# Patient Record
Sex: FEMALE | Race: WHITE | NOT HISPANIC OR LATINO | Employment: STUDENT | URBAN - METROPOLITAN AREA
[De-identification: names, ages, dates, MRNs, and addresses within clinical notes are randomized per-mention and may not be internally consistent; named-entity substitution may affect disease eponyms.]

---
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
## 2019-01-02 RX ORDER — GUANFACINE 2 MG/1
TABLET ORAL
Qty: 60 TABLET | Refills: 0 | Status: SHIPPED | OUTPATIENT
Start: 2019-01-02 | End: 2019-08-09

## 2019-01-24 ENCOUNTER — PATIENT MESSAGE (OUTPATIENT)
Dept: PEDIATRICS | Facility: CLINIC | Age: 19
End: 2019-01-24

## 2019-01-24 DIAGNOSIS — F90.8 HYPERKINESIS OF CHILDHOOD WITH DEVELOPMENTAL DELAY: ICD-10-CM

## 2019-01-24 RX ORDER — GUANFACINE 2 MG/1
TABLET ORAL
Qty: 60 TABLET | Refills: 0 | OUTPATIENT
Start: 2019-01-24

## 2019-01-25 RX ORDER — GUANFACINE 2 MG/1
2 TABLET ORAL 2 TIMES DAILY
Qty: 60 TABLET | Refills: 0 | Status: SHIPPED | OUTPATIENT
Start: 2019-01-25 | End: 2019-02-24 | Stop reason: SDUPTHER

## 2019-01-25 NOTE — TELEPHONE ENCOUNTER
Mom is requesting a refill on Tenex until patient can be seen for a well visit and the first available appointment for a well visit is February 18th.  Allergies and pharmacy verified last office visit 11/09/17. Please advise thank you

## 2019-01-29 RX ORDER — LAMOTRIGINE 200 MG/1
TABLET ORAL
Qty: 30 TABLET | Refills: 2 | Status: SHIPPED | OUTPATIENT
Start: 2019-01-29 | End: 2019-03-01 | Stop reason: SDUPTHER

## 2019-02-24 DIAGNOSIS — F90.8 HYPERKINESIS OF CHILDHOOD WITH DEVELOPMENTAL DELAY: ICD-10-CM

## 2019-02-25 ENCOUNTER — OFFICE VISIT (OUTPATIENT)
Dept: PEDIATRICS | Facility: CLINIC | Age: 19
End: 2019-02-25
Payer: MEDICAID

## 2019-02-25 VITALS
WEIGHT: 230.25 LBS | BODY MASS INDEX: 40.8 KG/M2 | HEART RATE: 112 BPM | DIASTOLIC BLOOD PRESSURE: 80 MMHG | SYSTOLIC BLOOD PRESSURE: 110 MMHG | HEIGHT: 63 IN

## 2019-02-25 DIAGNOSIS — F84.0 AUTISM: Chronic | ICD-10-CM

## 2019-02-25 DIAGNOSIS — Z00.00 ENCOUNTER FOR WELL ADULT EXAM WITHOUT ABNORMAL FINDINGS: Primary | ICD-10-CM

## 2019-02-25 DIAGNOSIS — R46.89 AGGRESSIVE BEHAVIOR: ICD-10-CM

## 2019-02-25 PROCEDURE — 99395 PREV VISIT EST AGE 18-39: CPT | Mod: S$PBB,,, | Performed by: PEDIATRICS

## 2019-02-25 PROCEDURE — 90471 IMMUNIZATION ADMIN: CPT | Mod: PBBFAC

## 2019-02-25 PROCEDURE — 99214 OFFICE O/P EST MOD 30 MIN: CPT | Mod: PBBFAC | Performed by: PEDIATRICS

## 2019-02-25 PROCEDURE — 99999 PR PBB SHADOW E&M-EST. PATIENT-LVL IV: CPT | Mod: PBBFAC,,, | Performed by: PEDIATRICS

## 2019-02-25 PROCEDURE — 99999 PR PBB SHADOW E&M-EST. PATIENT-LVL IV: ICD-10-PCS | Mod: PBBFAC,,, | Performed by: PEDIATRICS

## 2019-02-25 PROCEDURE — 99395 PR PREVENTIVE VISIT,EST,18-39: ICD-10-PCS | Mod: S$PBB,,, | Performed by: PEDIATRICS

## 2019-02-25 RX ORDER — GUANFACINE 2 MG/1
TABLET ORAL
Qty: 60 TABLET | Refills: 0 | Status: SHIPPED | OUTPATIENT
Start: 2019-02-25 | End: 2019-03-24 | Stop reason: SDUPTHER

## 2019-02-25 NOTE — PROGRESS NOTES
Subjective:      Ann Peterson is a 18 y.o. female here with mother. Patient brought in for Well Child      History of Present Illness: 19 yo with autism. Some issues with behavior. Excused for 1 day.  Looking at [employer] for students 18-21 yo for job training and skills training.  On BCPs. And valium daily. Seems better. Less outbursts.   No Flu shots.  Diet- eating well. Good variety. yogurt and milk. Likes veggies more than fruits.   Looking at new psychiatrist. Has been seeing [facility].  Well Child   Pertinent negatives include no abdominal pain, chest pain, congestion, coughing, fever, headaches, joint swelling, rash or sore throat.       Review of Systems   Constitutional: Negative for appetite change and fever.   HENT: Negative for congestion, rhinorrhea and sore throat.    Respiratory: Negative for cough.    Cardiovascular: Negative for chest pain.   Gastrointestinal: Negative for abdominal pain, constipation and diarrhea.   Musculoskeletal: Negative for joint swelling.   Skin: Negative for rash.   Neurological: Positive for seizures (last summer. less seizures). Negative for syncope and headaches.   Psychiatric/Behavioral: Negative for sleep disturbance and suicidal ideas. The patient is not nervous/anxious.        Objective:     Physical Exam   Constitutional: She appears well-developed and well-nourished.   HENT:   Head: Normocephalic and atraumatic.   Right Ear: External ear normal.   Left Ear: External ear normal.   Nose: Nose normal.   Mouth/Throat: Oropharynx is clear and moist. No oral lesions. Normal dentition. No dental caries.   Eyes: EOM are normal. Pupils are equal, round, and reactive to light.   Fundoscopic exam:       The right eye shows no papilledema.        The left eye shows no papilledema.   Neck: Neck supple. No thyromegaly present.   Cardiovascular: Normal rate, regular rhythm and normal heart sounds.   No murmur heard.  Pulmonary/Chest: Effort normal and breath sounds  normal.   Abdominal: Soft. She exhibits no distension and no mass. There is no tenderness.   Genitourinary:   Genitourinary Comments: Johnny stage 5   Musculoskeletal:   No scoliosis   Lymphadenopathy:     She has no cervical adenopathy.   Neurological: She is alert. She has normal reflexes. She displays normal reflexes. No cranial nerve deficit. She exhibits normal muscle tone.   Skin: Skin is warm. No rash noted.   Psychiatric: She has a normal mood and affect. Her behavior is normal.   Vitals reviewed.      Assessment:        1. Encounter for well adult exam    2. Autism  3. Aggressive behavior    Plan:        Ann was seen today for well child.    Diagnoses and all orders for this visit:    Encounter for well adult exam without abnormal findings  -     (In Office Administered) Hepatitis A Vaccine (Adult) (IM)    Safety and guidance information for age provided.  Discussed new school and plan. Looking for new psychiatrist. May look into seeing Dr Helms

## 2019-02-25 NOTE — PATIENT INSTRUCTIONS
If you have an active [facility] account, please look for your well child questionnaire to come to your [facility] account before your next well child visit.    Well-Child Checkup: 14 to 18 Years     Stay involved in your teens life. Make sure your teen knows youre always there when he or she needs to talk.     During the teen years, its important to keep having yearly checkups. Your teen may be embarrassed about having a checkup. Reassure your teen that the exam is normal and necessary. Be aware that the healthcare provider may ask to talk with your child without you in the exam room.  School and social issues  Here are some topics you, your teen, and the healthcare provider may want to discuss during this visit:  · School performance. How is your child doing in school? Is homework finished on time? Does your child stay organized? These are skills you can help with. Keep in mind that a drop in school performance can be a sign of other problems.  · Friendships. Do you like your childs friends? Do the friendships seem healthy? Make sure to talk to your teen about who his or her friends are and how they spend time together. Peer pressure can be a problem among teenagers.  · Life at home. How is your childs behavior? Does he or she get along with others in the family? Is he or she respectful of you, other adults, and authority? Does your child participate in family events, or does he or she withdraw from other family members?  · Risky behaviors. Many teenagers are curious about drugs, alcohol, smoking, and sex. Talk openly about these issues. Answer your childs questions, and dont be afraid to ask questions of your own. If youre not sure how to approach these topics, talk to the healthcare provider for advice.   Puberty  Your teen may still be experiencing some of the changes of puberty, such as:  · Acne and body odor. Hormones that increase during puberty can cause acne (pimples) on the face and body. Hormones  can also increase sweating and cause a stronger body odor.  · Body changes. The body grows and matures during puberty. Hair will grow in the pubic area and on other parts of the body. Girls grow breasts and menstruate (have monthly periods). A boys voice changes, becoming lower and deeper. As the penis matures, erections and wet dreams will start to happen. Talk to your teen about what to expect, and help him or her deal with these changes when possible.  · Emotional changes. Along with these physical changes, youll likely notice changes in your teens personality. He or she may develop an interest in dating and becoming more than friends with other kids. Also, its normal for your teen to be platt. Try to be patient and consistent. Encourage conversations, even when he or she doesnt seem to want to talk. No matter how your teen acts, he or she still needs a parent.  Nutrition and exercise tips  Your teenager likely makes his or her own decisions about what to eat and how to spend free time. You cant always have the final say, but you can encourage healthy habits. Your teen should:  · Get at least 30 to 60 minutes of physical activity every day. This time can be broken up throughout the day. After-school sports, dance or martial arts classes, riding a bike, or even walking to school or a friends house counts as activity.    · Limit screen time to 1 hour each day. This includes time spent watching TV, playing video games, using the computer, and texting. If your teen has a TV, computer, or video game console in the bedroom, consider replacing it with a music player.   · Eat healthy. Your child should eat fruits, vegetables, lean meats, and whole grains every day. Less healthy foods--like french fries, candy, and chips--should be eaten rarely. Some teens fall into the trap of snacking on junk food and fast food throughout the day. Make sure the kitchen is stocked with healthy choices for after-school snacks.  If your teen does choose to eat junk food, consider making him or her buy it with his or her own money.   · Eat 3 meals a day. Many kids skip breakfast and even lunch. Not only is this unhealthy, it can also hurt school performance. Make sure your teen eats breakfast. If your teen does not like the food served at school for lunch, allow him or her to prepare a bag lunch.  · Have at least one family meal with you each day. Busy schedules often limit time for sitting and talking. Sitting and eating together allows for family time. It also lets you see what and how your child eats.   · Limit soda and juice drinks. A small soda is OK once in a while. But soda, sports drinks, and juice drinks are no substitute for healthier drinks. Sports and juice drinks are no better. Water and low-fat or nonfat milk are the best choices.  Hygiene tips  Recommendations for good hygiene include the following:   · Teenagers should bathe or shower daily and use deodorant.  · Let the healthcare provider know if you or your teen have questions about hygiene or acne.  · Bring your teen to the dentist at least twice a year for teeth cleaning and a checkup.  · Remind your teen to brush and floss his or her teeth before bed.  Sleeping tips  During the teen years, sleep patterns may change. Many teenagers have a hard time falling asleep. This can lead to sleeping late the next morning. Here are some tips to help your teen get the rest he or she needs:  · Encourage your teen to keep a consistent bedtime, even on weekends. Sleeping is easier when the body follows a routine. Dont let your teen stay up too late at night or sleep in too long in the morning.  · Help your teen wake up, if needed. Go into the bedroom, open the blinds, and get your teen out of bed -- even on weekends or during school vacations.  · Being active during the day will help your child sleep better at night.  · Discourage use of the TV, computer, or video games for at least an  hour before your teen goes to bed. (This is good advice for parents, too!)  · Make a rule that cell phones must be turned off at night.  Safety tips  Recommendations to keep your teen safe include the following:  · Set rules for how your teen can spend time outside of the house. Give your child a nighttime curfew. If your child has a cell phone, check in periodically by calling to ask where he or she is and what he or she is doing.  · Make sure cell phones and portable music players are used safely and responsibly. Help your teen understand that it is dangerous to talk on the phone, text, or listen to music with headphones while he or she is riding a bike or walking outdoors, especially when crossing the street.  · Constant loud music can cause hearing damage, so monitor your teens music volume. Many music players let you set a limit for how loud the volume can be turned up. Check the directions for details.  · When your teen is old enough for a s license, encourage safe driving. Teach your teen to always wear a seat belt, drive the speed limit, and follow the rules of the road. Do not allow your teenager to text or talk on a cell phone while driving. (And dont do this yourself! Remember, you set an example.)  · Set rules and limits around driving and use of the car. If your teen gets a ticket or has an accident, there should be consequences. Driving is a privilege that can be taken away if your child doesnt follow the rules.  · Teach your child to make good decisions about drugs, alcohol, sex, and other risky behaviors. Work together to come up with strategies for staying safe and dealing with peer pressure. Make sure your teenager knows he or she can always come to you for help.  Tests and vaccines  If you have a strong family history of high cholesterol, your teens blood cholesterol may be tested at this visit. Based on recommendations from the CDC, at this visit your child may receive the following  vaccines:  · Meningococcal  · Influenza (flu), annually  Recognizing signs of depression  Its normal for teenagers to have extreme mood swings as a result of their changing hormones. Its also just a part of growing up. But sometimes a teenagers mood swings are signs of a larger problem. If your teen seems depressed for more than 2 weeks, you should be concerned. Signs of depression include:  · Use of drugs or alcohol  · Problems in school and at home  · Frequent episodes of running away  · Thoughts or talk of death or suicide  · Withdrawal from family and friends  · Sudden changes in eating or sleeping habits  · Sexual promiscuity or unplanned pregnancy  · Hostile behavior or rage  · Loss of pleasure in life  Depressed teens can be helped with treatment. Talk to your childs healthcare provider. Or check with your local mental health center, social service agency, or hospital. Assure your teen that his or her pain can be eased. Offer your love and support. If your teen talks about death or suicide, seek help right away.      Next checkup at: _______________________________     PARENT NOTES:  Date Last Reviewed: 12/1/2016  © 2946-2814 [employer]. [address] [place] [address], [place] [address]. All rights reserved. This information is not intended as a substitute for professional medical care. Always follow your healthcare professional's instructions.

## 2019-03-04 RX ORDER — LAMOTRIGINE 200 MG/1
TABLET ORAL
Qty: 30 TABLET | Refills: 2 | Status: SHIPPED | OUTPATIENT
Start: 2019-03-04 | End: 2019-04-12 | Stop reason: SDUPTHER

## 2019-03-19 ENCOUNTER — PATIENT MESSAGE (OUTPATIENT)
Dept: PEDIATRICS | Facility: CLINIC | Age: 19
End: 2019-03-19

## 2019-03-19 NOTE — ANESTHESIA PREPROCEDURE EVALUATION
2019  Ann Peterson is a 17 y.o., female.  Pre-operative evaluation for Procedure(s) (LRB):  MRI (MAGNETIC RESONANCE IMAGING) (N/A)    Ann Peterson is a 17 y.o. female     LDA:     Prev airway:     Drips:     Patient Active Problem List   Diagnosis    Autism    Aggressive behavior    Seizure disorder    Seizure    Body mass index, pediatric, greater than or equal to 95th percentile for age    Developmental delay       Review of patient's allergies indicates:  No Known Allergies     Current Facility-Administered Medications on File Prior to Encounter   Medication Dose Route Frequency Provider Last Rate Last Dose    ondansetron HCl (PF) 4 mg/2 mL injection    PRN Alessio Santos MD   4 mg at 07/06/15 1109     Current Outpatient Prescriptions on File Prior to Encounter   Medication Sig Dispense Refill    diazePAM (VALIUM) 5 MG tablet TAKE ONE BY MOUTH AS NEEDED FOR ANXIETY 10 tablet 0    guanfacine (TENEX) 2 MG tablet TAKE 1 TABLET BY MOUTH TWICE DAILY 60 tablet 0    lamoTRIgine (LAMICTAL) 200 MG tablet [facility] to [facility]. 25 mg/ml. Si cc po bid (150 mg po bid). 5 refills. Dispense 480 cc 480 tablet 5    risperiDONE (RISPERDAL) 3 MG Tab   3                 Diagnostic Studies:      [facility] Echo:        Anesthesia Evaluation    I have reviewed the Patient Summary Reports.    I have reviewed the Nursing Notes.   I have reviewed the Medications.     Review of Systems  Anesthesia Hx:  No problems with previous Anesthesia    Social:  Non-Smoker    Hematology/Oncology:  Hematology Normal   Oncology Normal     EENT/Dental:EENT/Dental Normal   Cardiovascular:  Cardiovascular Normal     Pulmonary:  Pulmonary Normal    Renal/:  Renal/ Normal     Hepatic/GI:  Hepatic/GI Normal    Musculoskeletal:  Musculoskeletal Normal    Neurological:   Last sz Kelin 5 x 2    Endocrine:  Endocrine Normal    Dermatological:  Skin Normal    Psych:   Autistic- no communication         Physical Exam  General:  Obesity    Airway/Jaw/Neck:  Airway Findings: Mouth Opening: Normal Tongue: Normal  General Airway Assessment: Adult  Mallampati: II  Improves to II with phonation.  TM Distance: Normal, at least 6 cm      Dental:  Dental Findings: In tact   Chest/Lungs:  Chest/Lungs Findings: Clear to auscultation     Heart/Vascular:  Heart Findings: Rate: Normal  Rhythm: Regular Rhythm  Sounds: Normal        Mental Status:  Mental Status Findings:         Anesthesia Plan  Type of Anesthesia, risks & benefits discussed:  Anesthesia Type:  general  Patient's Preference:   Intra-op Monitoring Plan:   Intra-op Monitoring Plan Comments:   Post Op Pain Control Plan:   Post Op Pain Control Plan Comments:   Induction:   Inhalation  Beta Blocker:         Informed Consent: Patient representative understands risks and agrees with Anesthesia plan.  Questions answered.   ASA Score: 3     Day of Surgery Review of History & Physical:     H&P completed by Anesthesiologist.       Ready For Surgery From Anesthesia Perspective.        104

## 2019-03-24 DIAGNOSIS — F90.8 HYPERKINESIS OF CHILDHOOD WITH DEVELOPMENTAL DELAY: ICD-10-CM

## 2019-03-25 RX ORDER — GUANFACINE 2 MG/1
TABLET ORAL
Qty: 60 TABLET | Refills: 0 | Status: SHIPPED | OUTPATIENT
Start: 2019-03-25 | End: 2019-04-22 | Stop reason: SDUPTHER

## 2019-03-26 ENCOUNTER — PATIENT MESSAGE (OUTPATIENT)
Dept: PEDIATRICS | Facility: CLINIC | Age: 19
End: 2019-03-26

## 2019-04-15 ENCOUNTER — TELEPHONE (OUTPATIENT)
Dept: PEDIATRIC NEUROLOGY | Facility: CLINIC | Age: 19
End: 2019-04-15

## 2019-04-15 RX ORDER — LAMOTRIGINE 200 MG/1
TABLET ORAL
Qty: 30 TABLET | Refills: 2 | Status: SHIPPED | OUTPATIENT
Start: 2019-04-15 | End: 2019-05-23 | Stop reason: SDUPTHER

## 2019-04-22 DIAGNOSIS — F90.8 HYPERKINESIS OF CHILDHOOD WITH DEVELOPMENTAL DELAY: ICD-10-CM

## 2019-04-22 RX ORDER — GUANFACINE 2 MG/1
TABLET ORAL
Qty: 60 TABLET | Refills: 0 | Status: SHIPPED | OUTPATIENT
Start: 2019-04-22 | End: 2019-08-09

## 2019-05-27 RX ORDER — LAMOTRIGINE 200 MG/1
TABLET ORAL
Qty: 30 TABLET | Refills: 2 | Status: SHIPPED | OUTPATIENT
Start: 2019-05-27 | End: 2019-07-05 | Stop reason: SDUPTHER

## 2019-07-10 RX ORDER — LAMOTRIGINE 200 MG/1
TABLET ORAL
Qty: 30 TABLET | Refills: 2 | Status: SHIPPED | OUTPATIENT
Start: 2019-07-10 | End: 2019-08-29 | Stop reason: SDUPTHER

## 2019-08-06 ENCOUNTER — TELEPHONE (OUTPATIENT)
Dept: PEDIATRICS | Facility: CLINIC | Age: 19
End: 2019-08-06

## 2019-08-06 NOTE — TELEPHONE ENCOUNTER
----- Message from Willa Holder sent at 8/6/2019 10:40 AM CDT -----  Needs Advice    Reason for call: congested on & off for 2 months, mom would like to schedule appt this week  not able to schedule adult pt.          Communication Preference: mom [phone]    Additional Information:

## 2019-08-06 NOTE — TELEPHONE ENCOUNTER
----- Message from Lucrecia Manley sent at 8/6/2019 11:40 AM CDT -----  Contact: Mom   Type:  Patient Returning Call    Who Called:Mom     Who Left Message for Patient:Shruthi    Does the patient know what this is regarding?:Appt    Would the patient rather a call back or a response via [facility]? Call Back     Best Call Back Number:[phone]    Additional Information:

## 2019-08-06 NOTE — TELEPHONE ENCOUNTER
Are you ok seeing her on Friday for congestion? Just wanted be sure sure now that she is 20 y/o. Mom said Thursday or Friday works best for her.

## 2019-08-09 ENCOUNTER — OFFICE VISIT (OUTPATIENT)
Dept: PEDIATRICS | Facility: CLINIC | Age: 19
End: 2019-08-09
Payer: MEDICAID

## 2019-08-09 VITALS — BODY MASS INDEX: 41.88 KG/M2 | HEART RATE: 122 BPM | WEIGHT: 232.69 LBS | TEMPERATURE: 98 F

## 2019-08-09 DIAGNOSIS — J30.9 ALLERGIC RHINITIS, UNSPECIFIED SEASONALITY, UNSPECIFIED TRIGGER: Primary | ICD-10-CM

## 2019-08-09 PROCEDURE — 99999 PR PBB SHADOW E&M-EST. PATIENT-LVL III: CPT | Mod: PBBFAC,,, | Performed by: PEDIATRICS

## 2019-08-09 PROCEDURE — 99999 PR PBB SHADOW E&M-EST. PATIENT-LVL III: ICD-10-PCS | Mod: PBBFAC,,, | Performed by: PEDIATRICS

## 2019-08-09 PROCEDURE — 99213 OFFICE O/P EST LOW 20 MIN: CPT | Mod: PBBFAC | Performed by: PEDIATRICS

## 2019-08-09 PROCEDURE — 99213 PR OFFICE/OUTPT VISIT, EST, LEVL III, 20-29 MIN: ICD-10-PCS | Mod: S$PBB,,, | Performed by: PEDIATRICS

## 2019-08-09 PROCEDURE — 99213 OFFICE O/P EST LOW 20 MIN: CPT | Mod: S$PBB,,, | Performed by: PEDIATRICS

## 2019-08-09 NOTE — PROGRESS NOTES
Subjective:      Ann Peterson is a 19 y.o. female here with mother. Patient brought in for Nasal Congestion      History of Present Illness:  HPI 20 yo with recurrent congestion. At times gags and is uncomfortable. No fever. Has 2 cats over the past year but mom feels not related as is well with the cats some of the time.  Have tried a variety of OTC med and not clear if any work. No fever. Some gagging and cough.  No sob. Has tried multiple meds and symptoms due seem to come and go.   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   Still some issues with violent behavior. Seeing child psychiatry for her meds.      Review of Systems   Constitutional: Negative for appetite change and fever.   HENT: Positive for congestion. Negative for ear discharge, facial swelling and rhinorrhea.    Respiratory: Negative for cough and shortness of breath.    Gastrointestinal: Negative for diarrhea and vomiting.   Genitourinary: Negative for decreased urine volume.   Skin: Negative for rash.   Hematological: Negative for adenopathy.   Psychiatric/Behavioral: Negative for sleep disturbance.       Objective:     Physical Exam   Constitutional: She appears well-developed and well-nourished. No distress.   HENT:   Right Ear: External ear normal.   Left Ear: External ear normal.   Nose: Nose normal.   Mouth/Throat: Oropharynx is clear and moist.   Swollen turbinates, some what red   Eyes: Conjunctivae are normal.   Neck: Neck supple.   Cardiovascular: Normal rate, regular rhythm and normal heart sounds.   Pulmonary/Chest: Effort normal and breath sounds normal.   Abdominal: Soft. She exhibits no distension and no mass. There is no tenderness.   Musculoskeletal: Normal range of motion.   Lymphadenopathy:     She has no cervical adenopathy.   Skin: No rash noted.   Vitals reviewed.      Assessment:        1. Allergic rhinitis, unspecified seasonality, unspecified trigger         Plan:       suspect AR vs  recurrent URIs. Given time of year ( summer) would suspect allergies. Mom to try regular use of zyrtec or other antihistamine and see if can tolerate nasal steroids.  School working to give resources including psychiatrists to help young adults with disabilities.

## 2019-08-17 NOTE — TELEPHONE ENCOUNTER
Refill request for Guanfacine 2mg  Last seen: 11/23/2016  Allergies and pharmacy verified    This is a [facility] patient but he will be out for the rest of the week and mom only has 2 left.     118

## 2019-08-22 DIAGNOSIS — N94.6 DYSMENORRHEA: ICD-10-CM

## 2019-08-22 DIAGNOSIS — N94.3 PMS (PREMENSTRUAL SYNDROME): ICD-10-CM

## 2019-08-22 DIAGNOSIS — F84.0 AUTISM: ICD-10-CM

## 2019-08-22 RX ORDER — ACETAMINOPHEN AND CODEINE PHOSPHATE 120; 12 MG/5ML; MG/5ML
1 SOLUTION ORAL DAILY
Qty: 90 TABLET | Refills: 3 | Status: SHIPPED | OUTPATIENT
Start: 2019-08-22 | End: 2020-02-04

## 2019-08-29 RX ORDER — LAMOTRIGINE 200 MG/1
TABLET ORAL
Qty: 30 TABLET | Refills: 2 | Status: SHIPPED | OUTPATIENT
Start: 2019-08-29 | End: 2019-10-10 | Stop reason: SDUPTHER

## 2019-09-10 ENCOUNTER — OFFICE VISIT (OUTPATIENT)
Dept: OBSTETRICS AND GYNECOLOGY | Facility: CLINIC | Age: 19
End: 2019-09-10
Payer: MEDICAID

## 2019-09-10 VITALS
BODY MASS INDEX: 43 KG/M2 | WEIGHT: 233.69 LBS | HEIGHT: 62 IN | DIASTOLIC BLOOD PRESSURE: 72 MMHG | SYSTOLIC BLOOD PRESSURE: 112 MMHG

## 2019-09-10 DIAGNOSIS — Z30.41 ENCOUNTER FOR SURVEILLANCE OF CONTRACEPTIVE PILLS: Primary | ICD-10-CM

## 2019-09-10 PROCEDURE — 99212 OFFICE O/P EST SF 10 MIN: CPT | Mod: S$PBB,,, | Performed by: OBSTETRICS & GYNECOLOGY

## 2019-09-10 PROCEDURE — 99213 OFFICE O/P EST LOW 20 MIN: CPT | Mod: PBBFAC | Performed by: OBSTETRICS & GYNECOLOGY

## 2019-09-10 PROCEDURE — 99212 PR OFFICE/OUTPT VISIT, EST, LEVL II, 10-19 MIN: ICD-10-PCS | Mod: S$PBB,,, | Performed by: OBSTETRICS & GYNECOLOGY

## 2019-09-10 PROCEDURE — 99999 PR PBB SHADOW E&M-EST. PATIENT-LVL III: CPT | Mod: PBBFAC,,, | Performed by: OBSTETRICS & GYNECOLOGY

## 2019-09-10 PROCEDURE — 99999 PR PBB SHADOW E&M-EST. PATIENT-LVL III: ICD-10-PCS | Mod: PBBFAC,,, | Performed by: OBSTETRICS & GYNECOLOGY

## 2019-09-10 RX ORDER — GUANFACINE 2 MG/1
2 TABLET ORAL
COMMUNITY
Start: 2019-04-25 | End: 2020-12-01

## 2019-09-10 RX ORDER — RISPERIDONE 3 MG/1
3 TABLET ORAL
COMMUNITY
Start: 2019-04-25 | End: 2020-08-25 | Stop reason: ALTCHOICE

## 2019-09-10 RX ORDER — RISPERIDONE 0.5 MG/1
0.5 TABLET ORAL
COMMUNITY
Start: 2019-04-25 | End: 2020-08-25 | Stop reason: ALTCHOICE

## 2019-09-10 NOTE — PROGRESS NOTES
Gynecology    SUBJECTIVE:     Chief Complaint: Annual Exam       History of Present Illness:  19 year old who returns for follow up. She has Autism and behavior issues as well as increased aggression with menstrual cycles.  Last year, we started her on ortho micronor which she crushes for administration.  Parents report that the medication is working well.  She does still seem to have cramps occasionally and they give her ibuprofen which helps.  She only has occasional spotting with the medication.  No other issues.    Review of Systems:  Review of Systems   Genitourinary: Negative for menorrhagia, menstrual problem, pelvic pain, vaginal bleeding, vaginal discharge, vaginal pain and vaginal odor.        OBJECTIVE:     Physical Exam:  Physical Exam   Constitutional: She is oriented to person, place, and time. She appears well-developed and well-nourished.   Pulmonary/Chest: Effort normal.   Neurological: She is oriented to person, place, and time.   Skin: No pallor.   Psychiatric: She has a normal mood and affect. Her behavior is normal. Judgment and thought content normal.   Nursing note and vitals reviewed.        ASSESSMENT:       ICD-10-CM ICD-9-CM    1. Encounter for surveillance of contraceptive pills Z30.41 V25.41           Plan:      Ann was seen today for annual exam.    Diagnoses and all orders for this visit:    Encounter for surveillance of contraceptive pills    - continue with micronor; patient tolerating well without issues  - patient's mother will request refill when needed;  - discussed that they can come every other year if easier;  - discussed whether the patient would need exams; we reviewed that the guidelines suggest a pap starting at age 21 regardless of sexual activity; however, I reviewed that I would be okay with digital palpation if the cervix rather than a speculum exam given that we know she is not sexually active and has no way of acquiring HPV virus; if a pap were necessary, I would  recommended EUA; family in agreement    No orders of the defined types were placed in this encounter.      Follow up for annual or prn.    Fernanda Chin

## 2019-10-15 RX ORDER — LAMOTRIGINE 200 MG/1
TABLET ORAL
Qty: 30 TABLET | Refills: 2 | Status: SHIPPED | OUTPATIENT
Start: 2019-10-15 | End: 2020-02-10 | Stop reason: SDUPTHER

## 2019-11-27 NOTE — TELEPHONE ENCOUNTER
8 cc po q am and 7 cc po q hs x 1 week; then 8 cc po bid x 1 week;. Cleopatra swann 4/26/18 3:28 pm  
Message sent to mother via [facility] with Dr Blackburn's instructions.  
English

## 2019-12-23 ENCOUNTER — TELEPHONE (OUTPATIENT)
Dept: PEDIATRIC NEUROLOGY | Facility: CLINIC | Age: 19
End: 2019-12-23

## 2019-12-23 NOTE — TELEPHONE ENCOUNTER
----- Message from Dayami Gastelum sent at 12/21/2019  9:31 AM CST -----  Contact: [phone]   Needs Advice    Reason for call: medication         Communication Preference: dad  [phone]  Mom  [phone]     Additional Information:  Dad called to say that medication was supposed to be delivered to the home on yesterday and nothing showed up the home from [facility].  Medication is  lamoTRIgine (LAMICTAL). Pt's mother drove to [employer] to  medication.

## 2019-12-23 NOTE — TELEPHONE ENCOUNTER
Spoke to mother and informed her that [facility] has been notified and refill sent. She verbalized understanding. Dr Blackburn sent new prescription with refills 10/25/19, but refills were for 14 days.

## 2019-12-23 NOTE — TELEPHONE ENCOUNTER
----- Message from Gwendolyn Boswell sent at 12/21/2019  9:18 AM CST -----  Good morning, the patient's mother called in and stated [facility] Thank you.       Respectfully,       Gwendolyn Boswell

## 2019-12-23 NOTE — TELEPHONE ENCOUNTER
----- Message from Patricia Marcano sent at 12/23/2019  9:41 AM CST -----  Needs Advice    Reason for call: PA needed by Pharmacy        Communication Preference: Jyoti Curran- [phone]    Additional Information: Mom states [facility] needs a PA request done in order to refill this medication. Mom has called a on Thursday,Friday, Saturday and today to get this done. [facility] filled a temporary few day supply for the patient but will not give anymore until this PA request is done. The pharmacy is closed tomorrow for Christmas Eve & Javan day. Please submit a PA request.

## 2020-01-02 ENCOUNTER — TELEPHONE (OUTPATIENT)
Dept: PEDIATRIC NEUROLOGY | Facility: CLINIC | Age: 20
End: 2020-01-02

## 2020-01-02 NOTE — TELEPHONE ENCOUNTER
----- Message from Lucrecia Manley sent at 1/2/2020  8:52 AM CST -----  Type:  Needs Medical Advice    Who Called:Mom     Would the patient rather a call back or a response via [facility]? Call back     Best Call Back Number: [phone]    Additional Information: Mom would like to reschedule the pt appt.

## 2020-01-02 NOTE — TELEPHONE ENCOUNTER
Spoke to mother; cancelled 1/3/2020 appt due to conflicting schedules. Rescheduled to 2/10/2020 at 3pm.

## 2020-01-13 ENCOUNTER — PATIENT MESSAGE (OUTPATIENT)
Dept: PEDIATRICS | Facility: CLINIC | Age: 20
End: 2020-01-13

## 2020-01-20 ENCOUNTER — TELEPHONE (OUTPATIENT)
Dept: PEDIATRIC NEUROLOGY | Facility: CLINIC | Age: 20
End: 2020-01-20

## 2020-01-20 NOTE — TELEPHONE ENCOUNTER
Contacted [facility]; patient needs Lamictal refill not PA. Appt 2/10/2020. One refill approved for Lamictal

## 2020-01-20 NOTE — TELEPHONE ENCOUNTER
----- Message from Beatriz Bowman sent at 1/20/2020 12:46 PM CST -----  Contact: Mom [phone]  Prescription refill request.  RX name and strength (copy/paste from chart):   lamotrigine (LAMICTAL) 1 mg/mL Susp      Is this a 30 day or 90 day RX:  30    Pharmacy name and phone # (copy/paste from chart): [facility]    Additional information:  Calling to speak with the nurse regarding pt medication lamotrigine (LAMICTAL) 1 mg/mL Susp. Mom states the medication needs a PA and she would like a call back with advice.

## 2020-02-03 DIAGNOSIS — F84.0 AUTISM: ICD-10-CM

## 2020-02-03 DIAGNOSIS — N94.6 DYSMENORRHEA: ICD-10-CM

## 2020-02-03 DIAGNOSIS — N94.3 PMS (PREMENSTRUAL SYNDROME): ICD-10-CM

## 2020-02-03 RX ORDER — ACETAMINOPHEN AND CODEINE PHOSPHATE 120; 12 MG/5ML; MG/5ML
1 SOLUTION ORAL DAILY
Qty: 90 TABLET | Refills: 3 | Status: CANCELLED | OUTPATIENT
Start: 2020-02-03 | End: 2021-02-02

## 2020-02-04 RX ORDER — ACETAMINOPHEN AND CODEINE PHOSPHATE 120; 12 MG/5ML; MG/5ML
1 SOLUTION ORAL DAILY
Qty: 90 TABLET | Refills: 3 | Status: SHIPPED | OUTPATIENT
Start: 2020-02-04 | End: 2021-03-29

## 2020-02-10 ENCOUNTER — OFFICE VISIT (OUTPATIENT)
Dept: PEDIATRIC NEUROLOGY | Facility: CLINIC | Age: 20
End: 2020-02-10
Payer: MEDICAID

## 2020-02-10 ENCOUNTER — LAB VISIT (OUTPATIENT)
Dept: LAB | Facility: HOSPITAL | Age: 20
End: 2020-02-10
Payer: MEDICAID

## 2020-02-10 VITALS — HEIGHT: 62 IN | WEIGHT: 231.13 LBS | BODY MASS INDEX: 42.53 KG/M2

## 2020-02-10 DIAGNOSIS — G40.909 SEIZURE DISORDER: ICD-10-CM

## 2020-02-10 DIAGNOSIS — R62.50 DEVELOPMENTAL DELAY: ICD-10-CM

## 2020-02-10 DIAGNOSIS — R46.89 AGGRESSIVE BEHAVIOR: ICD-10-CM

## 2020-02-10 DIAGNOSIS — R56.9 SEIZURE: ICD-10-CM

## 2020-02-10 DIAGNOSIS — G40.909 SEIZURE DISORDER: Primary | ICD-10-CM

## 2020-02-10 DIAGNOSIS — F84.0 AUTISM: Chronic | ICD-10-CM

## 2020-02-10 LAB
ALBUMIN SERPL BCP-MCNC: 4.1 G/DL (ref 3.5–5.2)
ALP SERPL-CCNC: 130 U/L (ref 55–135)
ALT SERPL W/O P-5'-P-CCNC: 20 U/L (ref 10–44)
ANION GAP SERPL CALC-SCNC: 9 MMOL/L (ref 8–16)
AST SERPL-CCNC: 18 U/L (ref 10–40)
BASOPHILS # BLD AUTO: 0.03 K/UL (ref 0–0.2)
BASOPHILS NFR BLD: 0.3 % (ref 0–1.9)
BILIRUB SERPL-MCNC: 0.2 MG/DL (ref 0.1–1)
BUN SERPL-MCNC: 11 MG/DL (ref 6–20)
CALCIUM SERPL-MCNC: 9.8 MG/DL (ref 8.7–10.5)
CHLORIDE SERPL-SCNC: 105 MMOL/L (ref 95–110)
CHOLEST SERPL-MCNC: 171 MG/DL (ref 120–199)
CHOLEST/HDLC SERPL: 5 {RATIO} (ref 2–5)
CO2 SERPL-SCNC: 29 MMOL/L (ref 23–29)
CREAT SERPL-MCNC: 0.9 MG/DL (ref 0.5–1.4)
DIFFERENTIAL METHOD: ABNORMAL
EOSINOPHIL # BLD AUTO: 0.1 K/UL (ref 0–0.5)
EOSINOPHIL NFR BLD: 1.1 % (ref 0–8)
ERYTHROCYTE [DISTWIDTH] IN BLOOD BY AUTOMATED COUNT: 13.8 % (ref 11.5–14.5)
EST. GFR  (AFRICAN AMERICAN): >60 ML/MIN/1.73 M^2
EST. GFR  (NON AFRICAN AMERICAN): >60 ML/MIN/1.73 M^2
ESTIMATED AVG GLUCOSE: 117 MG/DL (ref 68–131)
GLUCOSE SERPL-MCNC: 92 MG/DL (ref 70–110)
HBA1C MFR BLD HPLC: 5.7 % (ref 4–5.6)
HCG INTACT+B SERPL-ACNC: <1.2 MIU/ML
HCT VFR BLD AUTO: 39.2 % (ref 37–48.5)
HDLC SERPL-MCNC: 34 MG/DL (ref 40–75)
HDLC SERPL: 19.9 % (ref 20–50)
HGB BLD-MCNC: 12.9 G/DL (ref 12–16)
LDLC SERPL CALC-MCNC: 112 MG/DL (ref 63–159)
LYMPHOCYTES # BLD AUTO: 2.6 K/UL (ref 1–4.8)
LYMPHOCYTES NFR BLD: 21.9 % (ref 18–48)
MCH RBC QN AUTO: 28.6 PG (ref 27–31)
MCHC RBC AUTO-ENTMCNC: 32.9 G/DL (ref 32–36)
MCV RBC AUTO: 87 FL (ref 82–98)
MONOCYTES # BLD AUTO: 1.1 K/UL (ref 0.3–1)
MONOCYTES NFR BLD: 9.4 % (ref 4–15)
NEUTROPHILS # BLD AUTO: 8.1 K/UL (ref 1.8–7.7)
NEUTROPHILS NFR BLD: 67.3 % (ref 38–73)
NONHDLC SERPL-MCNC: 137 MG/DL
PLATELET # BLD AUTO: 302 K/UL (ref 150–350)
PMV BLD AUTO: 10.3 FL (ref 9.2–12.9)
POTASSIUM SERPL-SCNC: 4.4 MMOL/L (ref 3.5–5.1)
PROT SERPL-MCNC: 7.3 G/DL (ref 6–8.4)
RBC # BLD AUTO: 4.51 M/UL (ref 4–5.4)
SODIUM SERPL-SCNC: 143 MMOL/L (ref 136–145)
T4 FREE SERPL-MCNC: 0.98 NG/DL (ref 0.71–1.51)
TRIGL SERPL-MCNC: 125 MG/DL (ref 30–150)
TSH SERPL DL<=0.005 MIU/L-ACNC: 2.38 UIU/ML (ref 0.4–4)
WBC # BLD AUTO: 11.94 K/UL (ref 3.9–12.7)

## 2020-02-10 PROCEDURE — 36415 COLL VENOUS BLD VENIPUNCTURE: CPT

## 2020-02-10 PROCEDURE — 99213 OFFICE O/P EST LOW 20 MIN: CPT | Mod: PBBFAC | Performed by: PSYCHIATRY & NEUROLOGY

## 2020-02-10 PROCEDURE — 99999 PR PBB SHADOW E&M-EST. PATIENT-LVL III: ICD-10-PCS | Mod: PBBFAC,,, | Performed by: PSYCHIATRY & NEUROLOGY

## 2020-02-10 PROCEDURE — 84443 ASSAY THYROID STIM HORMONE: CPT

## 2020-02-10 PROCEDURE — 84702 CHORIONIC GONADOTROPIN TEST: CPT

## 2020-02-10 PROCEDURE — 80175 DRUG SCREEN QUAN LAMOTRIGINE: CPT

## 2020-02-10 PROCEDURE — 85025 COMPLETE CBC W/AUTO DIFF WBC: CPT

## 2020-02-10 PROCEDURE — 83036 HEMOGLOBIN GLYCOSYLATED A1C: CPT

## 2020-02-10 PROCEDURE — 84439 ASSAY OF FREE THYROXINE: CPT

## 2020-02-10 PROCEDURE — 99213 PR OFFICE/OUTPT VISIT, EST, LEVL III, 20-29 MIN: ICD-10-PCS | Mod: S$PBB,,, | Performed by: PSYCHIATRY & NEUROLOGY

## 2020-02-10 PROCEDURE — 99213 OFFICE O/P EST LOW 20 MIN: CPT | Mod: S$PBB,,, | Performed by: PSYCHIATRY & NEUROLOGY

## 2020-02-10 PROCEDURE — 80053 COMPREHEN METABOLIC PANEL: CPT

## 2020-02-10 PROCEDURE — 99999 PR PBB SHADOW E&M-EST. PATIENT-LVL III: CPT | Mod: PBBFAC,,, | Performed by: PSYCHIATRY & NEUROLOGY

## 2020-02-10 PROCEDURE — 80061 LIPID PANEL: CPT

## 2020-02-10 RX ORDER — LAMOTRIGINE 200 MG/1
TABLET ORAL
Qty: 30 TABLET | Refills: 2 | Status: SHIPPED | OUTPATIENT
Start: 2020-02-10 | End: 2020-04-27

## 2020-02-10 NOTE — PROGRESS NOTES
Ann Peterson is a 19-year-old female child was initially seen by me on 10/22/2014.  The child returns today with her mother and father.    Ann carries the diagnosis of autism.  She has a seizure disorder and aggressive behavior toward self and others.    Her 1st seizure was in October of 2014.  At that time, her 10-year-old sister was with her.  She had generalized tonic-clonic activity which lasted 30-60 seconds.  A CT scan without contrast was done which was normal.  We have been unable to get an EEG.    I had the opportunity to review the chart.  Her last labs were in July of 2018.  At that time, her TSH was 4.142 (normal upper limits is 4.0).  Her Lamictal level was 6.7.    Ann is currently on Lamictal 25 mg per male 8 cc p.o. b.i.d. (200 mg p.o. b.i.d.).    In the spring of 2018, Ann's seizures became more frequent.  In addition, her aggression has gotten worse.  She remains under the care of Dr. dougherty.  She is on Risperdal, Tenex.    After we increased the Lamictal dosage, Mom said there have been no further seizures.  Mother feels she has been doing well.    Weight is 104.85 kg (an increase of 2.7 kg).  Her height is 158 cm.    I am able to listen to her heart and lungs.  Everything is normal.    Almost everything else is by observation.  She is okay as long as we leave her alone to play with her stickers and to pull the magnet out of the cover.    She has no ataxia.  She is able to walk into the room by herself.  She is sitting in a chair.  She is interacting with her parents today.  The last time she was here, she was talking to herself.    She has no tremor.  She has no nystagmus.    Extraocular movements are full and conjugate.  She stares at me.  I am not sure I get a follow.    The plan is to continue the same dose of Lamictal; obtain labs today; continue on psychiatric medications; see her back next year or sooner if there are problems.

## 2020-02-12 ENCOUNTER — TELEPHONE (OUTPATIENT)
Dept: PEDIATRIC NEUROLOGY | Facility: CLINIC | Age: 20
End: 2020-02-12

## 2020-02-12 LAB — LAMOTRIGINE SERPL-MCNC: 7.1 UG/ML (ref 2–15)

## 2020-02-12 NOTE — TELEPHONE ENCOUNTER
Contacted [facility] and informed pharmacist to disregard Lamictal prescription that was faxed. Contacted [facility] pharmacy and called in prescription.

## 2020-02-12 NOTE — TELEPHONE ENCOUNTER
----- Message from Olga Gastelum sent at 2/12/2020 11:09 AM CST -----  Contact: Pharmacy [phone]  Pharmacy is calling to clarify or change an RX.    RX name:  lamoTRIgine (LAMICTAL) 200 MG tablet    What do they need to clarify:  Pharmacy doesn't do compound drugs says they will put Rx on hold and is requesting a call back.    Additional comments:

## 2020-04-20 ENCOUNTER — PATIENT MESSAGE (OUTPATIENT)
Dept: PEDIATRICS | Facility: CLINIC | Age: 20
End: 2020-04-20

## 2020-04-27 RX ORDER — LAMOTRIGINE 200 MG/1
TABLET ORAL
Qty: 60 TABLET | Refills: 2 | Status: SHIPPED | OUTPATIENT
Start: 2020-04-27 | End: 2020-07-20

## 2020-04-27 RX ORDER — LAMOTRIGINE 200 MG/1
TABLET ORAL
Qty: 30 TABLET | Refills: 2 | Status: SHIPPED | OUTPATIENT
Start: 2020-04-27 | End: 2020-12-01

## 2020-06-04 ENCOUNTER — HOSPITAL ENCOUNTER (EMERGENCY)
Facility: HOSPITAL | Age: 20
Discharge: HOME | End: 2020-06-04
Attending: EMERGENCY MEDICINE
Payer: MEDICAID

## 2020-06-04 VITALS
BODY MASS INDEX: 35.36 KG/M2 | WEIGHT: 220 LBS | SYSTOLIC BLOOD PRESSURE: 135 MMHG | HEART RATE: 76 BPM | OXYGEN SATURATION: 98 % | RESPIRATION RATE: 16 BRPM | DIASTOLIC BLOOD PRESSURE: 77 MMHG | TEMPERATURE: 99 F | HEIGHT: 66 IN

## 2020-06-04 DIAGNOSIS — S61.412A LACERATION OF LEFT HAND WITHOUT FOREIGN BODY, INITIAL ENCOUNTER: Primary | ICD-10-CM

## 2020-06-04 PROCEDURE — 12002 RPR S/N/AX/GEN/TRNK2.6-7.5CM: CPT

## 2020-06-04 PROCEDURE — 25000003 PHARM REV CODE 250: Performed by: PHYSICIAN ASSISTANT

## 2020-06-04 PROCEDURE — 99283 EMERGENCY DEPT VISIT LOW MDM: CPT | Mod: 25

## 2020-06-04 RX ORDER — LORAZEPAM 0.5 MG/1
1 TABLET ORAL
Status: COMPLETED | OUTPATIENT
Start: 2020-06-04 | End: 2020-06-04

## 2020-06-04 RX ORDER — LIDOCAINE HYDROCHLORIDE 10 MG/ML
10 INJECTION INFILTRATION; PERINEURAL
Status: COMPLETED | OUTPATIENT
Start: 2020-06-04 | End: 2020-06-04

## 2020-06-04 RX ADMIN — LIDOCAINE-EPINEPHRINE-TETRACAINE GEL 4-0.05-0.5% 1 ML: 4-0.05-0.5 GEL at 07:06

## 2020-06-04 RX ADMIN — LORAZEPAM 1 MG: 0.5 TABLET ORAL at 08:06

## 2020-06-04 RX ADMIN — LIDOCAINE HYDROCHLORIDE 10 ML: 10 INJECTION, SOLUTION INFILTRATION; PERINEURAL at 07:06

## 2020-06-05 NOTE — ED PROVIDER NOTES
Encounter Date: 2020    SCRIBE #1 NOTE: I, Cordell Boyd, am scribing for, and in the presence of,  Foreign Gallagher PA-C. I have scribed the following portions of the note - Other sections scribed: HPI, ROS, PE.       History     Chief Complaint   Patient presents with    Laceration     pt. with father, who reports pt. is austic and has a tick at times. father reports he thinks pt. hit a glass window and accidently caused a lac to her left hand on the top. area is wrapped with no noted bleeding.      CC: Laceration    HPI:  This is a 19 y.o. female with a PMHx of Autism. She presents to the Emergency Department with a cc of multiple lacerations to the left hand which occurred just PTA. The complaint is secondary to the patient breaking a glass window. Per father, he outside doing yard work when he saw the patient through the window before hearing it break. Father states that patient is autistic and sometimes has a tick which he thinks caused the patient to accidentally break the window. There were no other symptoms reported. No alleviating or worsening factors reported. Father reports no prior history of similar symptoms.         The history is provided by a relative. No  was used.     Review of patient's allergies indicates:  No Known Allergies  Past Medical History:   Diagnosis Date    Autism     Seizure      No past surgical history on file.  Family History   Problem Relation Age of Onset    Hypertension Father     Autism spectrum disorder Sister     Hypertension Paternal Grandmother     Heart disease Paternal Grandmother     Hyperlipidemia Paternal Grandmother     Breast cancer Neg Hx     Cancer Neg Hx     Colon cancer Neg Hx     Diabetes Neg Hx     Eclampsia Neg Hx     Miscarriages / Stillbirths Neg Hx     Ovarian cancer Neg Hx      labor Neg Hx     Stroke Neg Hx      Social History     Tobacco Use    Smoking status: Never Smoker    Smokeless tobacco: Never Used    Substance Use Topics    Alcohol use: No    Drug use: No     Review of Systems   Unable to perform ROS: Other       Physical Exam     Initial Vitals [06/04/20 1852]   BP Pulse Resp Temp SpO2   127/76 93 20 97.6 °F (36.4 °C) 99 %      MAP       --         Physical Exam    Nursing note and vitals reviewed.  Constitutional: She appears well-developed and well-nourished.   HENT:   Head: Normocephalic and atraumatic.   Eyes: Conjunctivae and EOM are normal. Pupils are equal, round, and reactive to light.   Neck: Normal range of motion. Neck supple.   Cardiovascular: Normal rate, regular rhythm, normal heart sounds and intact distal pulses.   Pulmonary/Chest: Breath sounds normal. No respiratory distress.   Abdominal: Soft. Bowel sounds are normal. There is no tenderness.   Musculoskeletal:   2 cm laceration to the dorsal aspect of the left hand. 1 cm laceration to the dorsal aspect of the left hand. Fine superficial laceration to the left thumb, index finger, and middle finger. Full ROM of left hand. Patient is able to extend and flex all digits without issue. None of the lacerations are actively bleeding.   Neurological: She is alert. GCS score is 15. GCS eye subscore is 4. GCS verbal subscore is 5. GCS motor subscore is 6.   Skin: Skin is warm and dry. Capillary refill takes less than 2 seconds.   Psychiatric: She has a normal mood and affect.         ED Course   Lac Repair  Date/Time: 6/4/2020 9:41 PM  Performed by: Foreign Gallagher PA-C  Authorized by: Bhargavi Negron MD   Consent Done: Yes  Consent given by: parent  Body area: upper extremity  Location details: left hand  Laceration length: 2 cm  Foreign bodies: no foreign bodies  Tendon involvement: none  Nerve involvement: none    Anesthesia:  Local Anesthetic: LET (lido,epi,tetracaine)  Irrigation solution: saline  Amount of cleaning: standard  Debridement: none  Degree of undermining: none  Skin closure: Ethilon (5-0)  Number of sutures: 6  Technique:  simple  Approximation: close  Approximation difficulty: simple  Patient tolerance: Patient tolerated the procedure well with no immediate complications    Lac Repair  Date/Time: 6/4/2020 9:43 PM  Performed by: Foreign Gallagher PA-C  Authorized by: Bhargavi Negron MD   Consent Done: Yes  Consent given by: parent  Body area: upper extremity  Location details: left hand  Laceration length: 1 cm  Foreign bodies: no foreign bodies  Tendon involvement: none  Nerve involvement: none  Vascular damage: no    Anesthesia:  Local Anesthetic: LET (lido,epi,tetracaine)  Irrigation solution: saline  Amount of cleaning: standard  Debridement: none  Degree of undermining: none  Skin closure: Ethilon (5-0)  Number of sutures: 1  Technique: simple  Approximation: close  Approximation difficulty: simple  Patient tolerance: Patient tolerated the procedure well with no immediate complications        Labs Reviewed - No data to display       Imaging Results          X-Ray Hand 3 view Left (Final result)  Result time 06/04/20 20:43:50    Final result by Chase Phelps MD (06/04/20 20:43:50)                 Impression:      See above.      Electronically signed by: Chase Phelps MD  Date:    06/04/2020  Time:    20:43             Narrative:    EXAMINATION:  XR HAND COMPLETE 3 VIEW LEFT    CLINICAL HISTORY:  lacerations, r/o foreign bodies;.    TECHNIQUE:  PA, lateral, and oblique views of the left hand were performed.    COMPARISON:  None    FINDINGS:  No evidence of acute displaced fracture, dislocation, or osseous destructive process.  There is mild negative ulnar variance.    Punctate radiopaque density, possible foreign body is seen at or just beneath the skin surface at the ulnar aspect of the wrist.                                 Medical Decision Making:   Clinical Tests:   Radiological Study: Ordered and Reviewed  ED Management:  Hemodynamically stable.  Nontoxic and in no acute distress.  Patient is overall well-appearing,  pleasant.  Wound is not actively bleeding at this time.  Patient tolerated laceration repair without complication or issue.  Will discharge home with close PCP follow-up and return instructions for suture removal and wound check.  Patient's father verbalizes understanding and is agreeable with plan.  Return instructions given for any worsening or additional concerning symptoms            Scribe Attestation:   Scribe #1: I performed the above scribed service and the documentation accurately describes the services I performed. I attest to the accuracy of the note.                          Clinical Impression:     1. Laceration of left hand without foreign body, initial encounter          Disposition:   Disposition: Discharged  Condition: Stable     ED Disposition Condition    Discharge Stable        ED Prescriptions     None        Follow-up Information     Follow up With Specialties Details Why Contact Info    [facility] Go in 1 week For suture removal, For wound re-check [address] [facility] [address] [phone]    David Rojas III, MD Pediatrics Schedule an appointment as soon as possible for a visit   [address]  [place] [address]  [phone]      [facility] Emergency Medicine Go to  If symptoms worsen [address] [address] [phone]                        Scribe attestation: I, Foreign Gallagher, personally performed the services described in this documentation. All medical record entries made by the scribe were at my direction and in my presence.  I have reviewed the chart and agree that the record reflects my personal performance and is accurate and complete.               Foreign Gallagher PA-C  06/05/20 [id number]

## 2020-06-05 NOTE — DISCHARGE INSTRUCTIONS
Please follow discharge instructions provided and make sure to follow-up with your PCP to discuss today's emergency department visit and for further evaluation and management.  Please return to the emergency department in 1 week for wound check and suture removal.  Please return to the emergency department immediately if she develops increased pain, increased bleeding, redness and warmth at the site, or any concerning signs of infection.

## 2020-08-06 ENCOUNTER — PATIENT MESSAGE (OUTPATIENT)
Dept: PEDIATRICS | Facility: CLINIC | Age: 20
End: 2020-08-06

## 2020-08-10 ENCOUNTER — TELEPHONE (OUTPATIENT)
Dept: PEDIATRICS | Facility: CLINIC | Age: 20
End: 2020-08-10

## 2020-08-10 NOTE — TELEPHONE ENCOUNTER
Are you still able to see this patient for well visit or does she need to be seen at primary care?  Thanks!

## 2020-08-25 ENCOUNTER — PATIENT MESSAGE (OUTPATIENT)
Dept: PEDIATRICS | Facility: CLINIC | Age: 20
End: 2020-08-25

## 2020-08-25 ENCOUNTER — OFFICE VISIT (OUTPATIENT)
Dept: PEDIATRICS | Facility: CLINIC | Age: 20
End: 2020-08-25
Payer: MEDICAID

## 2020-08-25 VITALS
DIASTOLIC BLOOD PRESSURE: 70 MMHG | BODY MASS INDEX: 38.38 KG/M2 | SYSTOLIC BLOOD PRESSURE: 120 MMHG | HEART RATE: 80 BPM | HEIGHT: 62 IN | WEIGHT: 208.56 LBS

## 2020-08-25 DIAGNOSIS — Z00.00 ENCOUNTER FOR PREVENTIVE HEALTH EXAMINATION: Primary | ICD-10-CM

## 2020-08-25 PROCEDURE — 99213 PR OFFICE/OUTPT VISIT, EST, LEVL III, 20-29 MIN: ICD-10-PCS | Mod: S$PBB,,, | Performed by: PEDIATRICS

## 2020-08-25 PROCEDURE — 99999 PR PBB SHADOW E&M-EST. PATIENT-LVL III: CPT | Mod: PBBFAC,,, | Performed by: PEDIATRICS

## 2020-08-25 PROCEDURE — 99213 OFFICE O/P EST LOW 20 MIN: CPT | Mod: S$PBB,,, | Performed by: PEDIATRICS

## 2020-08-25 PROCEDURE — 90471 IMMUNIZATION ADMIN: CPT | Mod: PBBFAC,VFC

## 2020-08-25 PROCEDURE — 99213 OFFICE O/P EST LOW 20 MIN: CPT | Mod: PBBFAC,25 | Performed by: PEDIATRICS

## 2020-08-25 PROCEDURE — 99999 PR PBB SHADOW E&M-EST. PATIENT-LVL III: ICD-10-PCS | Mod: PBBFAC,,, | Performed by: PEDIATRICS

## 2020-08-25 RX ORDER — RISPERIDONE 1 MG/1
1 TABLET ORAL DAILY
COMMUNITY
Start: 2020-06-04 | End: 2020-12-01

## 2020-08-25 RX ORDER — RISPERIDONE 0.5 MG/1
0.5 TABLET ORAL
COMMUNITY
Start: 2019-04-25 | End: 2020-12-01

## 2020-08-25 RX ORDER — ARIPIPRAZOLE 15 MG/1
15 TABLET ORAL DAILY
COMMUNITY
Start: 2020-06-01 | End: 2020-12-01

## 2020-08-25 NOTE — PATIENT INSTRUCTIONS
Counseling and Referral of Other Preventative  (Italic type indicates deductible and co-insurance are waived)    Patient Name: Ann Peterson  Today's Date: 8/25/2020    Health Maintenance       Date Due Completion Date    Hepatitis C Screening 1999 ---    HPV Vaccines (1 - 2-dose series) 07/09/2010 ---    HIV Screening 07/09/2014 ---    Chlamydia Screening 07/09/2014 ---    Pap Smear 07/09/2020 ---    Influenza Vaccine (1) 09/01/2020 ---    TETANUS VACCINE 08/05/2021 8/5/2011        No orders of the defined types were placed in this encounter.    The following information is provided to all patients.  This information is to help you find resources for any of the problems found today that may be affecting your health:                Living healthy guide: www.[facility].[place].gov      Understanding Diabetes: www.diabetes.org      Eating healthy: www.cdc.gov/healthyweight      [facility] home safety checklist: www.cdc.gov/steadi/patient.html      Agency on Aging: www.goea.[place].gov      Alcoholics anonymous (AA): www.aa.org      Physical Activity: www.keily.nih.gov/iz4vhjf      Tobacco use: www.quitwithusla.org

## 2020-08-25 NOTE — LETTER
August 26, 2020    Ann Peterson  [address]  [place] [address]             [facility] [address]  [address]  [place] [address]  Phone: [phone] To whom it may concern,         Ann Peterson is a 19 yo patient of [facility] with a Diagnosis of Autism, seizure disorder and aggressive behavior. She is nonverbal and often has self stimulatory behavior including hitting or scratching herself. This leads to multiple bruises and sores obvious on her body mostly over arms and legs. If there are any questions I can answer about her care please feel free to let me know.     David Rojas III, M.D.

## 2020-08-25 NOTE — PROGRESS NOTES
Subjective:     Ann Peterson is a 20 y.o. female here with father. Patient brought in for Well Child       History was provided by the parents.    Ann Peterson is a 20 y.o. female who is here for this well-child visit.    Current Issues:  Current concerns: Ann was weaned off of risperidone and has only been taking aripiprazole for the past 3 months. Parents say her appetite has decreased and she has lost some weight, which was the intended effect of discontinuing risperidone. However since starting aripiprazole she has had significant worsening in her aggressive behavior, self-injury, and tendency to injure others. Parents say she has had at least one major outburst each day since switching to the aripiprazole. She has also had terrible insomnia. Her psychiatrist attempted to start her on a sleep medication last month but she had a grand mal seizure on day 3 of taking this new medication so it was discontinued. She had not had a seizure in about a year and a half prior to this episode and has not had any since.    Last menstrual period: about 1 week ago    Review of Nutrition:  Current diet: parents say she will eat pretty much anything  Balanced diet? yes    Social Screening:   Discipline concerns? yes - aggressive, self-injurious behaviors; worse since discontinuing risperidone and starting aripiprazole monotherapy  School performance: taking 30 minute online classes daily, mom says she really enjoys them    Review of Systems   Constitutional: Positive for activity change and appetite change. Negative for fatigue and fever.   HENT: Negative.    Respiratory: Negative.    Cardiovascular: Negative.    Gastrointestinal: Negative.    Endocrine: Negative.    Genitourinary: Negative.    Skin: Negative.    Allergic/Immunologic: Negative.    Neurological: Positive for seizures.   Psychiatric/Behavioral: Positive for agitation, behavioral problems, self-injury and sleep disturbance.         Objective:      Physical Exam  Constitutional:       General: She is not in acute distress.     Appearance: She is obese. She is not ill-appearing, toxic-appearing or diaphoretic.   HENT:      Head: Normocephalic and atraumatic.      Right Ear: Tympanic membrane normal.      Left Ear: There is impacted cerumen.      Nose: Nose normal.      Mouth/Throat:      Mouth: Mucous membranes are moist.      Pharynx: Oropharynx is clear.   Eyes:      Extraocular Movements: Extraocular movements intact.      Conjunctiva/sclera: Conjunctivae normal.      Pupils: Pupils are equal, round, and reactive to light.   Neck:      Musculoskeletal: Normal range of motion and neck supple. No neck rigidity or muscular tenderness.   Cardiovascular:      Rate and Rhythm: Normal rate and regular rhythm.      Pulses: Normal pulses.   Pulmonary:      Effort: Pulmonary effort is normal.      Breath sounds: Normal breath sounds.   Abdominal:      General: Bowel sounds are normal.      Palpations: Abdomen is soft. There is no mass.      Tenderness: There is no abdominal tenderness.   Genitourinary:     Comments: Johnny Stage 5  Musculoskeletal: Normal range of motion.   Lymphadenopathy:      Cervical: No cervical adenopathy.   Skin:     General: Skin is warm and dry.      Capillary Refill: Capillary refill takes less than 2 seconds.      Findings: Lesion (lesions on arms and legs from scratching/picking at skin) present.   Neurological:      Mental Status: She is alert. Mental status is at baseline.      Comments: Nonverbal         Assessment:       Ann is a 21 y/o F who presents for well visit. Per parents, having worsening aggression/destructive behaviors since switching from risperidone to aripiprazole.      Plan:      1. Anticipatory guidance discussed. Recommend transferring care to Dr. Quintero as patient now an adult. Also recommend continuing care with current psychiatrist (Dr. Arevalo) and discussing antipsychotic options to help reduce  aggressive/injurious behavior.    2.  Weight management:  The patient was counseled regarding nutrition     3. Immunizations today: per orders.

## 2020-08-25 NOTE — PROGRESS NOTES
I have reviewed the notes, assessments, and/or procedures performed by Dr Patel , I concur with her/his documentation of Ann Peterson. Does also seem to still be having issues with cramping with menses but much less bleeding. Would discuss options with GYN.  Looking at longer care options at home. Mom to discuss what may be needed for letter.

## 2020-08-31 ENCOUNTER — TELEPHONE (OUTPATIENT)
Dept: PEDIATRIC NEUROLOGY | Facility: CLINIC | Age: 20
End: 2020-08-31

## 2020-08-31 NOTE — TELEPHONE ENCOUNTER
----- Message from Mindy Hermosillo sent at 8/31/2020 12:59 PM CDT -----  Contact: Reyes From [facility]   Reyes from [facility] would like a call back about getting a diagnosis code for Lamotrigine.

## 2020-08-31 NOTE — TELEPHONE ENCOUNTER
Spoke with Reyes From [facility]  and gave diagnosis code for Lamictal G40.909 seizure disorder

## 2020-10-26 ENCOUNTER — TELEPHONE (OUTPATIENT)
Dept: PEDIATRIC NEUROLOGY | Facility: CLINIC | Age: 20
End: 2020-10-26

## 2020-10-26 NOTE — TELEPHONE ENCOUNTER
Returned call to parent, advised that patient would need to follow with adult neurology due to age as Dr. Blackburn has now retired. Mother voiced understanding, provided scheduling number for [facility].   ----- Message from Yessica Short sent at 10/26/2020  9:19 AM CDT -----  Contact: Tex Vallejo [phone]  Mom needs call back. Patient was seeing Dr Blackburn. Patient is now age 20. Mom is wanting to know if patient has to move on to an adult doctor?

## 2020-10-27 ENCOUNTER — PATIENT MESSAGE (OUTPATIENT)
Dept: PEDIATRICS | Facility: CLINIC | Age: 20
End: 2020-10-27

## 2020-10-27 DIAGNOSIS — F84.0 AUTISM: Chronic | ICD-10-CM

## 2020-10-27 DIAGNOSIS — G40.909 SEIZURE DISORDER: Primary | ICD-10-CM

## 2020-10-27 DIAGNOSIS — R56.9 SEIZURES: Primary | ICD-10-CM

## 2020-10-30 ENCOUNTER — TELEPHONE (OUTPATIENT)
Dept: NEUROLOGY | Facility: CLINIC | Age: 20
End: 2020-10-30

## 2020-10-30 RX ORDER — LAMOTRIGINE 200 MG/1
TABLET ORAL
Qty: 60 TABLET | Refills: 2 | Status: SHIPPED | OUTPATIENT
Start: 2020-10-30 | End: 2020-12-01

## 2020-11-05 ENCOUNTER — TELEPHONE (OUTPATIENT)
Dept: NEUROLOGY | Facility: CLINIC | Age: 20
End: 2020-11-05

## 2020-11-05 NOTE — TELEPHONE ENCOUNTER
----- Message from Jesus Dumont sent at 11/5/2020  1:11 PM CST -----  Contact: Genevieve ( mom ) @ [phone]  Caller calling to schedule the NP appt,caller was informed by Latasha no available appt till January 2021, caller states patient had a recent seizure, pls call to discuss options

## 2020-11-06 NOTE — TELEPHONE ENCOUNTER
Spoke to patients mom, advised that we still dont have any cancellation or openings before January but to please keep checking in with me

## 2020-11-09 ENCOUNTER — TELEPHONE (OUTPATIENT)
Dept: NEUROLOGY | Facility: CLINIC | Age: 20
End: 2020-11-09

## 2020-11-09 ENCOUNTER — PATIENT MESSAGE (OUTPATIENT)
Dept: PEDIATRICS | Facility: CLINIC | Age: 20
End: 2020-11-09

## 2020-11-09 DIAGNOSIS — R19.7 DIARRHEA, UNSPECIFIED TYPE: Primary | ICD-10-CM

## 2020-11-09 NOTE — TELEPHONE ENCOUNTER
----- Message from Ivy Raza sent at 11/9/2020  9:27 AM CST -----  Contact: Genevieve @ [phone]  Pt mother calling regarding her daughter's meds. She needs a refill to be sent to [facility].

## 2020-11-16 NOTE — TELEPHONE ENCOUNTER
Ann has had another week of diarrhea. Would you like to place orders for a stool sample so parents may collect and bring from home?    Thanks!

## 2020-11-18 ENCOUNTER — PATIENT MESSAGE (OUTPATIENT)
Dept: PEDIATRICS | Facility: CLINIC | Age: 20
End: 2020-11-18

## 2020-11-18 ENCOUNTER — LAB VISIT (OUTPATIENT)
Dept: LAB | Facility: HOSPITAL | Age: 20
End: 2020-11-18
Attending: PEDIATRICS
Payer: MEDICAID

## 2020-11-18 DIAGNOSIS — R19.7 DIARRHEA, UNSPECIFIED TYPE: ICD-10-CM

## 2020-11-18 PROCEDURE — 87427 SHIGA-LIKE TOXIN AG IA: CPT

## 2020-11-18 PROCEDURE — 87046 STOOL CULTR AEROBIC BACT EA: CPT | Mod: 59

## 2020-11-18 PROCEDURE — 87209 SMEAR COMPLEX STAIN: CPT

## 2020-11-18 PROCEDURE — 87045 FECES CULTURE AEROBIC BACT: CPT

## 2020-11-19 ENCOUNTER — TELEPHONE (OUTPATIENT)
Dept: PEDIATRICS | Facility: CLINIC | Age: 20
End: 2020-11-19

## 2020-11-19 ENCOUNTER — PATIENT MESSAGE (OUTPATIENT)
Dept: PEDIATRICS | Facility: CLINIC | Age: 20
End: 2020-11-19

## 2020-11-19 NOTE — TELEPHONE ENCOUNTER
----- Message from Jeannette Bowman sent at 11/19/2020  4:03 AM CST -----  Regarding: lab Client Services  Contact: [phone]  Good Morning,    My name is Jeannette Bowman I work in the Lab Client Services. We had a problem with some lab work on this patient. If someone from your office could call us at [phone] or ext. [phone] that would be great. Anyone in my department can help.     Thank you

## 2020-11-19 NOTE — TELEPHONE ENCOUNTER
Spoke to lab. Ova and parasite stool studies were canceled due to insufficient amount of stool collected. Dr. Rojas informed.

## 2020-11-20 LAB
E COLI SXT1 STL QL IA: NEGATIVE
E COLI SXT2 STL QL IA: NEGATIVE

## 2020-11-21 LAB — BACTERIA STL CULT: NORMAL

## 2020-11-22 ENCOUNTER — PATIENT MESSAGE (OUTPATIENT)
Dept: PEDIATRICS | Facility: CLINIC | Age: 20
End: 2020-11-22

## 2020-11-23 ENCOUNTER — TELEPHONE (OUTPATIENT)
Dept: PEDIATRICS | Facility: CLINIC | Age: 20
End: 2020-11-23

## 2020-11-23 DIAGNOSIS — R19.7 DIARRHEA, UNSPECIFIED TYPE: Primary | ICD-10-CM

## 2020-11-24 ENCOUNTER — LAB VISIT (OUTPATIENT)
Dept: LAB | Facility: HOSPITAL | Age: 20
End: 2020-11-24
Attending: PEDIATRICS
Payer: MEDICAID

## 2020-11-24 DIAGNOSIS — R19.7 DIARRHEA, UNSPECIFIED TYPE: ICD-10-CM

## 2020-11-24 PROCEDURE — 87209 SMEAR COMPLEX STAIN: CPT

## 2020-11-27 LAB — O+P STL MICRO: NORMAL

## 2020-11-30 ENCOUNTER — PATIENT MESSAGE (OUTPATIENT)
Dept: PEDIATRICS | Facility: CLINIC | Age: 20
End: 2020-11-30

## 2020-11-30 DIAGNOSIS — R19.7 DIARRHEA, UNSPECIFIED TYPE: Primary | ICD-10-CM

## 2020-11-30 NOTE — TELEPHONE ENCOUNTER
21 yo with Autism and persistent diarrhea now with weight loss over last several weeks. Stool studies unremarkable. Will refer to GI.

## 2020-12-01 ENCOUNTER — OFFICE VISIT (OUTPATIENT)
Dept: GASTROENTEROLOGY | Facility: CLINIC | Age: 20
End: 2020-12-01
Payer: MEDICAID

## 2020-12-01 ENCOUNTER — PATIENT MESSAGE (OUTPATIENT)
Dept: GASTROENTEROLOGY | Facility: CLINIC | Age: 20
End: 2020-12-01

## 2020-12-01 VITALS
WEIGHT: 206.13 LBS | DIASTOLIC BLOOD PRESSURE: 70 MMHG | OXYGEN SATURATION: 96 % | HEART RATE: 120 BPM | HEIGHT: 62 IN | BODY MASS INDEX: 37.93 KG/M2 | RESPIRATION RATE: 16 BRPM | SYSTOLIC BLOOD PRESSURE: 124 MMHG | TEMPERATURE: 98 F

## 2020-12-01 DIAGNOSIS — R19.7 DIARRHEA, UNSPECIFIED TYPE: Primary | ICD-10-CM

## 2020-12-01 PROCEDURE — 99203 PR OFFICE/OUTPT VISIT, NEW, LEVL III, 30-44 MIN: ICD-10-PCS | Mod: S$PBB,,, | Performed by: INTERNAL MEDICINE

## 2020-12-01 PROCEDURE — 99999 PR PBB SHADOW E&M-EST. PATIENT-LVL IV: ICD-10-PCS | Mod: PBBFAC,,, | Performed by: INTERNAL MEDICINE

## 2020-12-01 PROCEDURE — 99214 OFFICE O/P EST MOD 30 MIN: CPT | Mod: PBBFAC,PO | Performed by: INTERNAL MEDICINE

## 2020-12-01 PROCEDURE — 99999 PR PBB SHADOW E&M-EST. PATIENT-LVL IV: CPT | Mod: PBBFAC,,, | Performed by: INTERNAL MEDICINE

## 2020-12-01 PROCEDURE — 99203 OFFICE O/P NEW LOW 30 MIN: CPT | Mod: S$PBB,,, | Performed by: INTERNAL MEDICINE

## 2020-12-01 RX ORDER — CHLORPROMAZINE HYDROCHLORIDE 100 MG/1
100 TABLET, FILM COATED ORAL 2 TIMES DAILY
COMMUNITY
End: 2023-09-19

## 2020-12-01 RX ORDER — DICYCLOMINE HYDROCHLORIDE 10 MG/1
10 CAPSULE ORAL 3 TIMES DAILY PRN
Qty: 60 CAPSULE | Refills: 2 | Status: SHIPPED | OUTPATIENT
Start: 2020-12-01 | End: 2023-09-19

## 2020-12-01 RX ORDER — DIAZEPAM 5 MG/1
1 TABLET ORAL
COMMUNITY
Start: 2019-10-23

## 2020-12-01 NOTE — PROGRESS NOTES
"Subjective:       Patient ID: Ann Peterson is a 20 y.o. female.    Chief Complaint: Consult (Dr. David Rojas) and Diarrhea (x 5 1/2 weeks)    21 yo F with severe autism brought in by parents for diarrhea.  They state that they started some CBD oil approximately 5-6 weeks ago and almost immediately the pt began to have diarrhea.  She uses the toilet for urination but does not want to have BM in the toilet so they put panty liners in her underwear.  They have noted liquid stool in the underwear and it is far more frequent that what they saw before.  She does have BM in the toilet as well, and her willingness to do this also has them concerned.  They have not seen bleeding.  They have given her Pepto and Imodium b/c she whines and makes noises that make them feel like she is uncomfortable.  She is eating less and they believe she has made the association that eating makes her belly hurt more; thus she is losing weight as well.  She is having large amounts of flatus as well.  Neither of the parents have been sick.    The pt's psychiatrist has been adjusting her medications over the past few months as well.    Review of Systems   Constitutional: Positive for appetite change and unexpected weight change.   Eyes: Negative for photophobia and visual disturbance.   Respiratory: Negative for chest tightness, shortness of breath and wheezing.    Cardiovascular: Negative for chest pain, palpitations and leg swelling.   Gastrointestinal: Positive for diarrhea. Negative for blood in stool.   Genitourinary: Negative for dysuria, flank pain and hematuria.   Musculoskeletal: Negative for joint swelling and myalgias.   Integumentary:  Negative for color change and rash.     Objective:       /70 (BP Location: Right arm, Patient Position: Sitting, BP Method: X-Large (Manual))   Pulse (!) 120   Temp 98 °F (36.7 °C) (Other (see comments))   Resp 16   Ht 5' 1.61" (1.565 m)   Wt 93.5 kg (206 lb 1.6 oz)   SpO2 96%   " BMI 38.17 kg/m²     Physical Exam  Constitutional:       Appearance: She is not ill-appearing.   Abdominal:      General: There is no distension.      Palpations: Abdomen is soft.      Tenderness: There is no abdominal tenderness.   Skin:     General: Skin is warm and dry.   Neurological:      General: No focal deficit present.      Mental Status: She is alert. Mental status is at baseline.     Stool culture (-) but no other stool studies done    Assessment:       1. Diarrhea, unspecified type        Plan:       Diarrhea, unspecified type  -     X-Ray Abdomen Flat And Erect; Future; Expected date: 12/01/2020, being done to quantify stool burden.  She does not want to have a BM and thus constipation with overflow diarrhea is a possibility    -     dicyclomine (BENTYL) 10 MG capsule; Take 1 capsule (10 mg total) by mouth 3 (three) times daily as needed.  Dispense: 60 capsule; Refill: 2    -     Clostridium difficile EIA; Future; Expected date: 12/01/2020  -     Giardia / Cryptosporidum, EIA; Future; Expected date: 12/01/2020  -     Pancreatic elastase, fecal; Future; Expected date: 12/01/2020  -     WBC, Stool; Future; Expected date: 12/01/2020  -     Pregnancy, urine rapid; Future; Expected date: 12/01/2020

## 2020-12-01 NOTE — LETTER
December 1, 2020      David Rojas III, MD  [address]  [facility] [address]           [facility] - Gastro - [address]   [address], [facility]   [place] [address]  Phone: [phone]  Fax: [phone]          Patient: Ann Peterson   MR Number: [id number]   YOB: 2000   Date of Visit: 12/1/2020       Dear Dr. David Rojas III:    Thank you for referring Ann Peterson to me for evaluation. Attached you will find relevant portions of my assessment and plan of care.    If you have questions, please do not hesitate to call me. I look forward to following Ann Peterson along with you.    Sincerely,    Lizzy Mcgill MD    Enclosure  CC:  No Recipients    If you would like to receive this communication electronically, please contact [email or web address] or [phone] to request more information on [email or web address] Link access.    For providers and/or their staff who would like to refer a patient to [facility], please contact us through our one-stop-shop provider referral line, [facility], at [phone].    If you feel you have received this communication in error or would no longer like to receive these types of communications, please e-mail [email or web address]

## 2020-12-09 ENCOUNTER — PATIENT MESSAGE (OUTPATIENT)
Dept: PEDIATRIC GASTROENTEROLOGY | Facility: CLINIC | Age: 20
End: 2020-12-09

## 2020-12-09 ENCOUNTER — TELEPHONE (OUTPATIENT)
Dept: GASTROENTEROLOGY | Facility: CLINIC | Age: 20
End: 2020-12-09

## 2020-12-09 ENCOUNTER — PATIENT MESSAGE (OUTPATIENT)
Dept: GASTROENTEROLOGY | Facility: CLINIC | Age: 20
End: 2020-12-09

## 2020-12-09 DIAGNOSIS — K59.01 SLOW TRANSIT CONSTIPATION: Primary | ICD-10-CM

## 2020-12-09 NOTE — LETTER
December 9, 2020    Ann Peterson  [address]  [place] [address]             [facility] - Gastro - [address]   [address], [facility]   [place] [address]  Phone: [phone]  Fax: [phone] Dear Ms. Peterson:    I am sorry to hear that you have not been feeling well.  Because the constipation is so severe, it will take us a few weeks to get this under control.  The laxatives will cause you to have diarrhea and likely to be more tired that usual.  Unfortunately this means that you will likely miss school.  I hope that your school can take this note and excuse you from your lessons for a few weeks.      If you have any questions or concerns, please don't hesitate to call.    Sincerely,        Lizzy Mcgill MD

## 2020-12-09 NOTE — TELEPHONE ENCOUNTER
Letter written for pt's school.  Unfortunately I can not get it to print OR sent to her via portal.  Can you help??

## 2020-12-10 ENCOUNTER — PATIENT MESSAGE (OUTPATIENT)
Dept: GASTROENTEROLOGY | Facility: CLINIC | Age: 20
End: 2020-12-10

## 2020-12-14 ENCOUNTER — PATIENT MESSAGE (OUTPATIENT)
Dept: PEDIATRICS | Facility: CLINIC | Age: 20
End: 2020-12-14

## 2020-12-15 ENCOUNTER — PATIENT MESSAGE (OUTPATIENT)
Dept: PEDIATRICS | Facility: CLINIC | Age: 20
End: 2020-12-15

## 2020-12-15 ENCOUNTER — TELEPHONE (OUTPATIENT)
Dept: NEUROLOGY | Facility: CLINIC | Age: 20
End: 2020-12-15
Payer: MEDICAID

## 2020-12-15 NOTE — TELEPHONE ENCOUNTER
----- Message from Maggie Joe sent at 12/15/2020 12:57 PM CST -----  Regarding: Appointment  Contact: Genevieve/[phone]  Calling to speak with Latasha to schedule an appointment as son as possible for epilepsy. Please call to confirm.

## 2020-12-29 ENCOUNTER — TELEPHONE (OUTPATIENT)
Dept: PEDIATRIC GASTROENTEROLOGY | Facility: CLINIC | Age: 20
End: 2020-12-29

## 2020-12-29 NOTE — TELEPHONE ENCOUNTER
Spoke with mom and confirmed pt GI appointment for tomorrow at 1:30 pm with Dr Miranda. Informed mom of the new visitors policy. Mom verbalized understanding.

## 2020-12-30 ENCOUNTER — OFFICE VISIT (OUTPATIENT)
Dept: PEDIATRIC GASTROENTEROLOGY | Facility: CLINIC | Age: 20
End: 2020-12-30
Payer: MEDICAID

## 2020-12-30 ENCOUNTER — HOSPITAL ENCOUNTER (OUTPATIENT)
Dept: RADIOLOGY | Facility: HOSPITAL | Age: 20
Discharge: HOME | End: 2020-12-30
Attending: PEDIATRICS
Payer: MEDICAID

## 2020-12-30 VITALS
SYSTOLIC BLOOD PRESSURE: 128 MMHG | OXYGEN SATURATION: 100 % | TEMPERATURE: 98 F | HEIGHT: 62 IN | BODY MASS INDEX: 38.14 KG/M2 | RESPIRATION RATE: 14 BRPM | DIASTOLIC BLOOD PRESSURE: 92 MMHG | HEART RATE: 93 BPM | WEIGHT: 207.25 LBS

## 2020-12-30 DIAGNOSIS — R46.89 AGGRESSIVE BEHAVIOR: ICD-10-CM

## 2020-12-30 DIAGNOSIS — R15.1 FECAL SOILING: ICD-10-CM

## 2020-12-30 DIAGNOSIS — K59.01 SLOW TRANSIT CONSTIPATION: ICD-10-CM

## 2020-12-30 DIAGNOSIS — K59.01 SLOW TRANSIT CONSTIPATION: Primary | ICD-10-CM

## 2020-12-30 DIAGNOSIS — F84.0 AUTISM: Chronic | ICD-10-CM

## 2020-12-30 DIAGNOSIS — F84.0 AUTISM: ICD-10-CM

## 2020-12-30 PROCEDURE — 99999 PR PBB SHADOW E&M-EST. PATIENT-LVL V: ICD-10-PCS | Mod: PBBFAC,,, | Performed by: PEDIATRICS

## 2020-12-30 PROCEDURE — 74018 RADEX ABDOMEN 1 VIEW: CPT | Mod: 26,,, | Performed by: RADIOLOGY

## 2020-12-30 PROCEDURE — 99215 OFFICE O/P EST HI 40 MIN: CPT | Mod: PBBFAC,25 | Performed by: PEDIATRICS

## 2020-12-30 PROCEDURE — 99204 OFFICE O/P NEW MOD 45 MIN: CPT | Mod: S$PBB,,, | Performed by: PEDIATRICS

## 2020-12-30 PROCEDURE — 99999 PR PBB SHADOW E&M-EST. PATIENT-LVL V: CPT | Mod: PBBFAC,,, | Performed by: PEDIATRICS

## 2020-12-30 PROCEDURE — 99204 PR OFFICE/OUTPT VISIT, NEW, LEVL IV, 45-59 MIN: ICD-10-PCS | Mod: S$PBB,,, | Performed by: PEDIATRICS

## 2020-12-30 PROCEDURE — 74018 XR ABDOMEN AP 1 VIEW: ICD-10-PCS | Mod: 26,,, | Performed by: RADIOLOGY

## 2020-12-30 PROCEDURE — 74018 RADEX ABDOMEN 1 VIEW: CPT | Mod: TC

## 2021-01-06 ENCOUNTER — LAB VISIT (OUTPATIENT)
Dept: LAB | Facility: HOSPITAL | Age: 21
End: 2021-01-06
Attending: PEDIATRICS
Payer: MEDICAID

## 2021-01-06 DIAGNOSIS — F84.0 AUTISM: Chronic | ICD-10-CM

## 2021-01-06 DIAGNOSIS — R46.89 AGGRESSIVE BEHAVIOR: ICD-10-CM

## 2021-01-06 DIAGNOSIS — R15.1 FECAL SOILING: ICD-10-CM

## 2021-01-06 DIAGNOSIS — K59.01 SLOW TRANSIT CONSTIPATION: ICD-10-CM

## 2021-01-06 PROCEDURE — 82656 EL-1 FECAL QUAL/SEMIQ: CPT

## 2021-01-06 PROCEDURE — 87329 GIARDIA AG IA: CPT

## 2021-01-06 PROCEDURE — 83993 ASSAY FOR CALPROTECTIN FECAL: CPT

## 2021-01-08 LAB
CRYPTOSP AG STL QL IA: NEGATIVE
ELASTASE 1, FECAL: 226 MCG/G
G LAMBLIA AG STL QL IA: NEGATIVE

## 2021-01-13 ENCOUNTER — PATIENT MESSAGE (OUTPATIENT)
Dept: PEDIATRIC GASTROENTEROLOGY | Facility: CLINIC | Age: 21
End: 2021-01-13

## 2021-01-13 LAB — CALPROTECTIN STL-MCNT: 175.1 MCG/G

## 2021-01-27 ENCOUNTER — PATIENT MESSAGE (OUTPATIENT)
Dept: NEUROLOGY | Facility: CLINIC | Age: 21
End: 2021-01-27

## 2021-01-27 ENCOUNTER — PATIENT MESSAGE (OUTPATIENT)
Dept: OBSTETRICS AND GYNECOLOGY | Facility: CLINIC | Age: 21
End: 2021-01-27

## 2021-01-27 ENCOUNTER — OFFICE VISIT (OUTPATIENT)
Dept: NEUROLOGY | Facility: CLINIC | Age: 21
End: 2021-01-27
Payer: MEDICAID

## 2021-01-27 DIAGNOSIS — F84.0 AUTISM: Chronic | ICD-10-CM

## 2021-01-27 DIAGNOSIS — G40.909 SEIZURE DISORDER: Primary | ICD-10-CM

## 2021-01-27 PROCEDURE — 99204 OFFICE O/P NEW MOD 45 MIN: CPT | Mod: 95,,, | Performed by: PSYCHIATRY & NEUROLOGY

## 2021-01-27 PROCEDURE — 99204 PR OFFICE/OUTPT VISIT, NEW, LEVL IV, 45-59 MIN: ICD-10-PCS | Mod: 95,,, | Performed by: PSYCHIATRY & NEUROLOGY

## 2021-02-02 ENCOUNTER — TELEPHONE (OUTPATIENT)
Dept: NEUROLOGY | Facility: CLINIC | Age: 21
End: 2021-02-02

## 2021-02-04 ENCOUNTER — TELEPHONE (OUTPATIENT)
Dept: NEUROLOGY | Facility: CLINIC | Age: 21
End: 2021-02-04

## 2021-02-04 ENCOUNTER — PATIENT MESSAGE (OUTPATIENT)
Dept: NEUROLOGY | Facility: CLINIC | Age: 21
End: 2021-02-04

## 2021-02-18 ENCOUNTER — PATIENT MESSAGE (OUTPATIENT)
Dept: NEUROLOGY | Facility: CLINIC | Age: 21
End: 2021-02-18

## 2021-03-03 RX ORDER — LAMOTRIGINE 200 MG/1
TABLET ORAL
COMMUNITY
Start: 2021-01-18 | End: 2021-09-16

## 2021-03-08 ENCOUNTER — PATIENT MESSAGE (OUTPATIENT)
Dept: NEUROLOGY | Facility: CLINIC | Age: 21
End: 2021-03-08

## 2021-03-29 ENCOUNTER — PATIENT MESSAGE (OUTPATIENT)
Dept: OBSTETRICS AND GYNECOLOGY | Facility: CLINIC | Age: 21
End: 2021-03-29

## 2021-04-07 ENCOUNTER — PATIENT MESSAGE (OUTPATIENT)
Dept: PEDIATRICS | Facility: CLINIC | Age: 21
End: 2021-04-07

## 2021-06-14 ENCOUNTER — TELEPHONE (OUTPATIENT)
Dept: NEUROLOGY | Facility: CLINIC | Age: 21
End: 2021-06-14

## 2021-06-16 ENCOUNTER — TELEPHONE (OUTPATIENT)
Dept: NEUROLOGY | Facility: CLINIC | Age: 21
End: 2021-06-16

## 2021-06-16 DIAGNOSIS — G40.909 SEIZURE DISORDER: ICD-10-CM

## 2021-06-16 DIAGNOSIS — Z79.899 HIGH RISK MEDICATION USE: Primary | ICD-10-CM

## 2021-07-27 ENCOUNTER — PATIENT MESSAGE (OUTPATIENT)
Dept: NEUROLOGY | Facility: CLINIC | Age: 21
End: 2021-07-27

## 2021-07-27 ENCOUNTER — TELEPHONE (OUTPATIENT)
Dept: NEUROLOGY | Facility: CLINIC | Age: 21
End: 2021-07-27

## 2021-07-28 ENCOUNTER — PATIENT MESSAGE (OUTPATIENT)
Dept: NEUROLOGY | Facility: CLINIC | Age: 21
End: 2021-07-28

## 2021-08-03 ENCOUNTER — LAB VISIT (OUTPATIENT)
Dept: LAB | Facility: HOSPITAL | Age: 21
End: 2021-08-03
Attending: PSYCHIATRY & NEUROLOGY
Payer: MEDICAID

## 2021-08-03 DIAGNOSIS — Z79.899 HIGH RISK MEDICATION USE: ICD-10-CM

## 2021-08-03 DIAGNOSIS — G40.909 SEIZURE DISORDER: ICD-10-CM

## 2021-08-03 PROCEDURE — 36415 COLL VENOUS BLD VENIPUNCTURE: CPT | Mod: PO | Performed by: PSYCHIATRY & NEUROLOGY

## 2021-08-03 PROCEDURE — 80175 DRUG SCREEN QUAN LAMOTRIGINE: CPT | Performed by: PSYCHIATRY & NEUROLOGY

## 2021-08-06 LAB — LAMOTRIGINE SERPL-MCNC: 5.3 UG/ML (ref 2–15)

## 2021-08-09 ENCOUNTER — PATIENT MESSAGE (OUTPATIENT)
Dept: NEUROLOGY | Facility: CLINIC | Age: 21
End: 2021-08-09

## 2021-09-14 DIAGNOSIS — G40.909 SEIZURE DISORDER: Primary | ICD-10-CM

## 2021-09-16 ENCOUNTER — OFFICE VISIT (OUTPATIENT)
Dept: NEUROLOGY | Facility: CLINIC | Age: 21
End: 2021-09-16
Payer: MEDICAID

## 2021-09-16 DIAGNOSIS — F84.0 AUTISM: Primary | ICD-10-CM

## 2021-09-16 DIAGNOSIS — R46.89 AGGRESSIVE BEHAVIOR: ICD-10-CM

## 2021-09-16 DIAGNOSIS — G40.909 SEIZURE DISORDER: ICD-10-CM

## 2021-09-16 PROCEDURE — 99215 PR OFFICE/OUTPT VISIT, EST, LEVL V, 40-54 MIN: ICD-10-PCS | Mod: 95,,, | Performed by: PSYCHIATRY & NEUROLOGY

## 2021-09-16 PROCEDURE — 99215 OFFICE O/P EST HI 40 MIN: CPT | Mod: 95,,, | Performed by: PSYCHIATRY & NEUROLOGY

## 2021-09-22 ENCOUNTER — PATIENT MESSAGE (OUTPATIENT)
Dept: NEUROLOGY | Facility: CLINIC | Age: 21
End: 2021-09-22

## 2021-10-05 ENCOUNTER — PATIENT MESSAGE (OUTPATIENT)
Dept: OBSTETRICS AND GYNECOLOGY | Facility: CLINIC | Age: 21
End: 2021-10-05

## 2021-10-29 ENCOUNTER — OFFICE VISIT (OUTPATIENT)
Dept: OBSTETRICS AND GYNECOLOGY | Facility: CLINIC | Age: 21
End: 2021-10-29
Payer: MEDICAID

## 2021-10-29 DIAGNOSIS — N94.6 DYSMENORRHEA: Primary | ICD-10-CM

## 2021-10-29 PROCEDURE — 99213 PR OFFICE/OUTPT VISIT, EST, LEVL III, 20-29 MIN: ICD-10-PCS | Mod: 95,,, | Performed by: OBSTETRICS & GYNECOLOGY

## 2021-10-29 PROCEDURE — 99213 OFFICE O/P EST LOW 20 MIN: CPT | Mod: 95,,, | Performed by: OBSTETRICS & GYNECOLOGY

## 2021-11-02 RX ORDER — IBUPROFEN 600 MG/1
600 TABLET ORAL EVERY 6 HOURS PRN
Qty: 60 TABLET | Refills: 2 | Status: SHIPPED | OUTPATIENT
Start: 2021-11-02 | End: 2022-11-02

## 2022-09-09 ENCOUNTER — PATIENT MESSAGE (OUTPATIENT)
Dept: NEUROLOGY | Facility: CLINIC | Age: 22
End: 2022-09-09
Payer: MEDICAID

## 2022-09-12 ENCOUNTER — OFFICE VISIT (OUTPATIENT)
Dept: NEUROLOGY | Facility: CLINIC | Age: 22
End: 2022-09-12
Payer: MEDICAID

## 2022-09-12 ENCOUNTER — LAB VISIT (OUTPATIENT)
Dept: LAB | Facility: HOSPITAL | Age: 22
End: 2022-09-12
Payer: MEDICAID

## 2022-09-12 VITALS — HEIGHT: 62 IN | BODY MASS INDEX: 46.96 KG/M2 | WEIGHT: 255.19 LBS

## 2022-09-12 DIAGNOSIS — F84.0 AUTISM: ICD-10-CM

## 2022-09-12 DIAGNOSIS — G40.909 SEIZURE DISORDER: Primary | ICD-10-CM

## 2022-09-12 DIAGNOSIS — G40.909 SEIZURE DISORDER: ICD-10-CM

## 2022-09-12 LAB
ALBUMIN SERPL BCP-MCNC: 4.1 G/DL (ref 3.5–5.2)
ALP SERPL-CCNC: 134 U/L (ref 55–135)
ALT SERPL W/O P-5'-P-CCNC: 16 U/L (ref 10–44)
ANION GAP SERPL CALC-SCNC: 14 MMOL/L (ref 8–16)
AST SERPL-CCNC: 13 U/L (ref 10–40)
BASOPHILS # BLD AUTO: 0.03 K/UL (ref 0–0.2)
BASOPHILS NFR BLD: 0.3 % (ref 0–1.9)
BILIRUB SERPL-MCNC: 0.2 MG/DL (ref 0.1–1)
BUN SERPL-MCNC: 9 MG/DL (ref 6–20)
CALCIUM SERPL-MCNC: 9.6 MG/DL (ref 8.7–10.5)
CHLORIDE SERPL-SCNC: 105 MMOL/L (ref 95–110)
CO2 SERPL-SCNC: 25 MMOL/L (ref 23–29)
CREAT SERPL-MCNC: 0.8 MG/DL (ref 0.5–1.4)
DIFFERENTIAL METHOD: NORMAL
EOSINOPHIL # BLD AUTO: 0.1 K/UL (ref 0–0.5)
EOSINOPHIL NFR BLD: 1.1 % (ref 0–8)
ERYTHROCYTE [DISTWIDTH] IN BLOOD BY AUTOMATED COUNT: 13.5 % (ref 11.5–14.5)
EST. GFR  (NO RACE VARIABLE): >60 ML/MIN/1.73 M^2
GLUCOSE SERPL-MCNC: 101 MG/DL (ref 70–110)
HCT VFR BLD AUTO: 37 % (ref 37–48.5)
HGB BLD-MCNC: 12.3 G/DL (ref 12–16)
IMM GRANULOCYTES # BLD AUTO: 0.03 K/UL (ref 0–0.04)
IMM GRANULOCYTES NFR BLD AUTO: 0.3 % (ref 0–0.5)
LYMPHOCYTES # BLD AUTO: 2.7 K/UL (ref 1–4.8)
LYMPHOCYTES NFR BLD: 26.2 % (ref 18–48)
MCH RBC QN AUTO: 29.8 PG (ref 27–31)
MCHC RBC AUTO-ENTMCNC: 33.2 G/DL (ref 32–36)
MCV RBC AUTO: 90 FL (ref 82–98)
MONOCYTES # BLD AUTO: 0.8 K/UL (ref 0.3–1)
MONOCYTES NFR BLD: 8 % (ref 4–15)
NEUTROPHILS # BLD AUTO: 6.6 K/UL (ref 1.8–7.7)
NEUTROPHILS NFR BLD: 64.1 % (ref 38–73)
NRBC BLD-RTO: 0 /100 WBC
PLATELET # BLD AUTO: 282 K/UL (ref 150–450)
PMV BLD AUTO: 10.2 FL (ref 9.2–12.9)
POTASSIUM SERPL-SCNC: 4.2 MMOL/L (ref 3.5–5.1)
PROT SERPL-MCNC: 7.1 G/DL (ref 6–8.4)
RBC # BLD AUTO: 4.13 M/UL (ref 4–5.4)
SODIUM SERPL-SCNC: 144 MMOL/L (ref 136–145)
WBC # BLD AUTO: 10.32 K/UL (ref 3.9–12.7)

## 2022-09-12 PROCEDURE — 36415 COLL VENOUS BLD VENIPUNCTURE: CPT

## 2022-09-12 PROCEDURE — 99213 OFFICE O/P EST LOW 20 MIN: CPT | Mod: PBBFAC

## 2022-09-12 PROCEDURE — 99999 PR PBB SHADOW E&M-EST. PATIENT-LVL III: ICD-10-PCS | Mod: PBBFAC,,,

## 2022-09-12 PROCEDURE — 85025 COMPLETE CBC W/AUTO DIFF WBC: CPT

## 2022-09-12 PROCEDURE — 80053 COMPREHEN METABOLIC PANEL: CPT

## 2022-09-12 PROCEDURE — 99999 PR PBB SHADOW E&M-EST. PATIENT-LVL III: CPT | Mod: PBBFAC,,,

## 2022-09-12 PROCEDURE — 3008F PR BODY MASS INDEX (BMI) DOCUMENTED: ICD-10-PCS | Mod: CPTII,,,

## 2022-09-12 PROCEDURE — 99215 PR OFFICE/OUTPT VISIT, EST, LEVL V, 40-54 MIN: ICD-10-PCS | Mod: S$PBB,,,

## 2022-09-12 PROCEDURE — 3008F BODY MASS INDEX DOCD: CPT | Mod: CPTII,,,

## 2022-09-12 PROCEDURE — 80175 DRUG SCREEN QUAN LAMOTRIGINE: CPT

## 2022-09-12 PROCEDURE — 1159F PR MEDICATION LIST DOCUMENTED IN MEDICAL RECORD: ICD-10-PCS | Mod: CPTII,,,

## 2022-09-12 PROCEDURE — 99215 OFFICE O/P EST HI 40 MIN: CPT | Mod: S$PBB,,,

## 2022-09-12 PROCEDURE — 1159F MED LIST DOCD IN RCRD: CPT | Mod: CPTII,,,

## 2022-09-12 RX ORDER — RISPERIDONE 1 MG/1
3 TABLET ORAL 2 TIMES DAILY
COMMUNITY
Start: 2022-06-17 | End: 2023-09-19

## 2022-09-12 NOTE — PROGRESS NOTES
[facility]  Epilepsy [facility] Progress Note    [facility] - NEUROLOGY 7TH [facility], [place]    Date: 9/12/22  Patient Name: Ann Peterson   MRN: [id number]   PCP: David Rojas Iii  Referring Provider: No ref. provider found    Assessment:   Ann Peterson is a 22 y.o. female presenting in follow up for epilepsy. No seizures on lamotrigine 250mg BID (10mls BID of 25mg/ml oral solution). Continue this medication at same dose. Refill called in today. Annual level and basic labs today. Follow up in one year or sooner with issues. Family is comfortable with plan and verbalizes agreement. All questions and concerns are addressed at this time. Advised to reach out over the portal with any concerns or breakthrough events.   Plan:     Problem List Items Addressed This Visit          Neuro    Autism (Chronic)    Seizure disorder - Primary    Relevant Orders    Lamotrigine level    CBC auto differential    Comprehensive metabolic panel (Completed)     I completed education on seizure first aid and safety. I recommended seizure precautions with regards to avoiding unsupervised water recreational activity or bathing in tubs, climbing or working at heights, operation of heavy or dangerous machinery, caution around fire and sources of high heat, as well as any other activity which could put a patient at danger in case of a seizure.  The patient does not drive.    As the patient is a female of childbearing age, I have counseled the patient on issues pertaining to pregnancy and epilepsy and recommended folic acid supplementation. I have reviewed and recommended the AAN guideline of folic acid supplementation prior to and during pregnancy.      Shellie De Souza PA-C   Neurology-Epilepsy  [facility]    Collaborating physician, Dr. Jesus Barney, was available during today's encounter.     I spent approximately 40 minutes on the day of this encounter preparing  to see the patient, obtaining and reviewing history and results, performing a medically appropriate exam, counseling and educating the patient/family/caregiver, documenting clinical information, coordinating care, and ordering medications, tests, procedures, and referrals.    Patient note was created using MModal Dictation.  Any errors in syntax or even information may not have been identified and edited on initial review prior to signing this note.  Subjective:   Patient seen in consultation at the request of No ref. provider found for the evaluation of seizure. A copy of this note will be sent to the referring physician.     CC: Epilepsy    Interval Events/ROS 9/12/2022:    Accompanied by parents who provide all of the history.     Current ASM/SEs: lamotrigine 10mls twice a day (250mg BID); no reported side effects   Breakthrough seizures/events: none; last seizure was 03/2021  Driving: no  Folic acid: no, recommended supplementation  Sleep: sleeps well for the most part; takes melatonin 10mg before bed    Mood: good, follows with psychiatry    Otherwise, no fever, no cold symptoms, no headache, no new weakness, no chest pain, no shortness of breath, no vomiting, no diarrhea, no constipation, no problems walking.    Recent Labs   Lab 02/10/20  1531 08/03/21  1000   Lamotrigine Lvl 7.1 5.3        HPI:   Ms. Ann Peterson is a 22 y.o. female who presents with a chief complaint of longstanding epilepsy.  Patient presents to transition care from Dr. lofton today.  Her mother provides the history.  She reports that Ann has been doing well since her last visit with no breakthrough seizures since January.  She is tolerating Lamictal with no side effects.  They have no complaints today.  She attends [employer] and the patient's father axis her PCA.  Patient's mother states the patient sees a behavioral psychiatrist regularly and psychiatry has requested a Genetics referral.    Seizure Type:  Unknown  Seizure Etiology:  Unknown  Current AEDs:  mg BID (tapering up to 250 mg BID)    The patient is accompanied by family who contribute to the history. This patient has 1 types of seizure as described below. The patient reports having seizures for years The patient reports to have stable seizure control. The seizure frequency is 2-3 per year. The last seizure was Jan 2020 . The patient does not report side effects from seizure medication.     Seizure Type 1:   Seizure Description: head turn (direction unclear), drooling, unresponsiveness followed by GTC, postictal confusion  Aura: None  Associated Symptoms:  tongue biting  Seizure Frequency: 2-3 years  Last seizure: Jan 2021    Handedness: right  Seizure Onset Age: 14  Seizure/ Epilepsy Risk Factors: childhood seizure  Birth/Developmental History: normal birth history and delayed developmental history  Seizure Triggers/ Provoking Features: stress and illness/medical problems  Previous Seizure Medications: lamotrigine (Lamictal, LTG)  Recent Med Changes: None  Other Treatments: *None  Prior Episodes of Status: None  Psychiatric/Behavioral Comorbitidies: Developmental delay (limited speech, needs help for most ADLs)  Surgical Candidacy: n/a    Prior Studies:  EEG : Not done  vEEG/ EMU evaluation: Not done  MRI of brain: 6/2018 WNL, , personally reviewed  AED levels:  LTG 7.1 8/3/21  CT/CTA Scan: 10/2014- WNL, (sinusitis)  PET Scan:  Not done  Neuropsychological Evaluation: Not done  DEXA Scan: Not done  Other studies:  Not done    PAST MEDICAL HISTORY:  Past Medical History:   Diagnosis Date    Autism     Seizure        PAST SURGICAL HISTORY:  No past surgical history on file.    CURRENT MEDS:  Current Outpatient Medications   Medication Sig Dispense Refill    chlorproMAZINE (THORAZINE) 100 MG tablet Take 100 mg by mouth 2 (two) times daily.      custom compound oral suspension Take by mouth 2 (two) times a day. Lamictal 25 mg/ml, 9 mg BID x 1 wk then 10  "mg BID 1 Bottle 0    diazePAM (VALIUM) 5 MG tablet Take 1 tablet by mouth as needed.      dicyclomine (BENTYL) 10 MG capsule Take 1 capsule (10 mg total) by mouth 3 (three) times daily as needed. (Patient not taking: Reported on 2020) 60 capsule 2    ibuprofen (ADVIL,MOTRIN) 600 MG tablet Take 1 tablet (600 mg total) by mouth every 6 (six) hours as needed for Pain. 60 tablet 2    lamotrigine (LAMICTAL) 1 mg/mL Susp Take 8 mLs (8 mg total) by mouth 2 (two) times a day. 480 mL 4    linaCLOtide (LINZESS) 290 mcg Cap capsule Take 1 capsule (290 mcg total) by mouth once daily. 90 capsule 3    norethindrone (MICRONOR) 0.35 mg tablet TAKE 1 TABLET(0.35 MG) BY MOUTH EVERY DAY 84 tablet 3     No current facility-administered medications for this visit.     Facility-Administered Medications Ordered in Other Visits   Medication Dose Route Frequency Provider Last Rate Last Admin    ondansetron HCl (PF) 4 mg/2 mL injection    PRN Alessio Santos III, MD   4 mg at 07/06/15 1109       ALLERGIES:  Review of patient's allergies indicates:  No Known Allergies    FAMILY HISTORY:  Family History   Problem Relation Age of Onset    Hypertension Father     Autism spectrum disorder Sister     Hypertension Paternal Grandmother     Heart disease Paternal Grandmother     Hyperlipidemia Paternal Grandmother     Breast cancer Neg Hx     Cancer Neg Hx     Colon cancer Neg Hx     Diabetes Neg Hx     Eclampsia Neg Hx     Miscarriages / Stillbirths Neg Hx     Ovarian cancer Neg Hx      labor Neg Hx     Stroke Neg Hx        SOCIAL HISTORY:  Social History     Tobacco Use    Smoking status: Never    Smokeless tobacco: Never   Substance Use Topics    Alcohol use: No    Drug use: No       Review of Systems:  12 system review of systems is negative except for the symptoms mentioned in HPI.      Objective:     Vitals:    22 1307   Weight: 115.8 kg (255 lb 2.9 oz)   Height: 5' 2.36" (1.584 m)     General: NAD, well nourished "   Eyes: no tearing, discharge, no erythema   ENT: moist mucous membranes of the oral cavity, nares patent    Neck: Supple  Cardiovascular: Warm and well perfused  Lungs: Normal work of breathing, normal chest wall excursions  Skin: No rash, lesions, or breakdown on exposed skin  Psychiatry: Mood and affect are appropriate   Abdomen: soft, non tender, non distended  Extremeties: No cyanosis, clubbing or edema.    Neurological   MENTAL STATUS: Nonverbal during our visit though makes noises  CRANIAL NERVES: Visual fields intact. EOMI. Facial sensation intact. Face symmetrical. Hearing grossly intact. Full shoulder shrug bilaterally. Tongue protrudes midline   SENSORY: Sensation is intact to light touch throughout.    MOTOR: Normal bulk and tone. 5/5 deltoid, biceps, triceps, interosseous, hand  bilaterally. 5/5 iliopsoas, knee extension/flexion, foot dorsi/plantarflexion bilaterally.    CEREBELLAR/COORDINATION/GAIT: Gait steady. Gross motor movements smooth

## 2022-09-12 NOTE — PATIENT INSTRUCTIONS
You came to Epilepsy Clinic because of your seizure disorder.  Your seizures are well controlled on lamotrigine 10mls in the morning and 10mls at night. Please continue this medication at this dose.     Do not miss any doses of medication. If a dose of medication is missed, take it as soon as it is remembered even if that means doubling up on the dose. Please get a lab test to check out the blood level of medication. Get regular sleep. Go to sleep at the same time and wake up at the same time every day. People with epilepsy require more sleep than people without epilepsy.  Sleeping 10-12 hours a day can be normal for a person with epilepsy.      Per [place] law, no episodes of loss of consciousness for 6 months before driving.  Avoid dangerous situations.  For example, no baths/pools alone, no heights, no power tools.  Wear a bike helmet.  If breakthrough seizures occur that are different in character, frequency, or duration from normal episodes, please patient portal me or call the office and we will decide the next steps. If multiple seizures occur in a row without return back to baseline, 911 needs to be called.     Return to clinic in 12 months or sooner with issues.  Please patient portal with any questions or concerns.    Shellie De Souza PA-C   Neurology-Epilepsy  [facility]

## 2022-09-15 LAB — LAMOTRIGINE SERPL-MCNC: 7.4 UG/ML (ref 2–15)

## 2023-01-30 ENCOUNTER — PATIENT MESSAGE (OUTPATIENT)
Dept: OBSTETRICS AND GYNECOLOGY | Facility: CLINIC | Age: 23
End: 2023-01-30
Payer: MEDICAID

## 2023-01-30 DIAGNOSIS — N94.6 DYSMENORRHEA: ICD-10-CM

## 2023-01-30 DIAGNOSIS — N94.6 DYSMENORRHEA: Primary | ICD-10-CM

## 2023-01-30 DIAGNOSIS — F84.0 AUTISM: ICD-10-CM

## 2023-01-30 DIAGNOSIS — N94.3 PMS (PREMENSTRUAL SYNDROME): ICD-10-CM

## 2023-01-30 RX ORDER — ACETAMINOPHEN AND CODEINE PHOSPHATE 120; 12 MG/5ML; MG/5ML
1 SOLUTION ORAL WEEKLY
Qty: 84 TABLET | Refills: 3 | Status: SHIPPED | OUTPATIENT
Start: 2023-01-30 | End: 2023-02-06 | Stop reason: SDUPTHER

## 2023-02-02 ENCOUNTER — PATIENT MESSAGE (OUTPATIENT)
Dept: PEDIATRICS | Facility: CLINIC | Age: 23
End: 2023-02-02
Payer: MEDICAID

## 2023-02-06 RX ORDER — ACETAMINOPHEN AND CODEINE PHOSPHATE 120; 12 MG/5ML; MG/5ML
1 SOLUTION ORAL DAILY
Qty: 84 TABLET | Refills: 3 | Status: SHIPPED | OUTPATIENT
Start: 2023-02-06 | End: 2024-01-09

## 2023-04-13 NOTE — TELEPHONE ENCOUNTER
Spoke to mother about glucoses. Will fax Ann next blood draw. Cleopatra swann 9/25/17 6:55 pm   Rifampin Pregnancy And Lactation Text: This medication is Pregnancy Category C and it isn't know if it is safe during pregnancy. It is also excreted in breast milk and should not be used if you are breast feeding.

## 2023-06-10 DIAGNOSIS — G40.909 EPILEPSY, UNSPECIFIED, NOT INTRACTABLE, WITHOUT STATUS EPILEPTICUS: ICD-10-CM

## 2023-06-13 RX ORDER — LAMOTRIGINE 200 MG/1
TABLET ORAL
Qty: 75 TABLET | Refills: 3 | Status: SHIPPED | OUTPATIENT
Start: 2023-06-13 | End: 2023-09-11

## 2023-07-07 ENCOUNTER — PATIENT MESSAGE (OUTPATIENT)
Dept: GASTROENTEROLOGY | Facility: CLINIC | Age: 23
End: 2023-07-07
Payer: MEDICAID

## 2023-07-07 DIAGNOSIS — K59.01 SLOW TRANSIT CONSTIPATION: Primary | ICD-10-CM

## 2023-07-09 ENCOUNTER — PATIENT MESSAGE (OUTPATIENT)
Dept: GASTROENTEROLOGY | Facility: CLINIC | Age: 23
End: 2023-07-09
Payer: MEDICAID

## 2023-08-17 ENCOUNTER — PATIENT MESSAGE (OUTPATIENT)
Dept: NEUROLOGY | Facility: CLINIC | Age: 23
End: 2023-08-17
Payer: MEDICAID

## 2023-08-22 ENCOUNTER — PATIENT MESSAGE (OUTPATIENT)
Dept: PEDIATRICS | Facility: CLINIC | Age: 23
End: 2023-08-22
Payer: MEDICAID

## 2023-08-23 ENCOUNTER — TELEPHONE (OUTPATIENT)
Dept: INTERNAL MEDICINE | Facility: CLINIC | Age: 23
End: 2023-08-23

## 2023-08-23 NOTE — TELEPHONE ENCOUNTER
Called and spoke to the mother Genevieve   She was seeing Bob but needs a PCP  He had recommend you as PCP   She is 23yrs old , Autistic and has seizures  Would you see her if not who would you recommend her seeing?

## 2023-08-23 NOTE — TELEPHONE ENCOUNTER
----- Message from Freda Sanchez sent at 8/23/2023  2:05 PM CDT -----  Contact: [phone] Genevieve  1MEDICALADVICE     Patient is calling for Medical Advice regarding: was recommended by dr David Elena     How long has patient had these symptoms:1    Pharmacy name and phone#:  [id number] - [place] [id number] [facility] [place] [address] [address]  [facility] [place] [address]  Phone: [phone] Fax: [phone]         Would like response via [employer]:  no     Comments:pts mother is calling she states the dr listed above recommended Dr Montague I did inform the pts mother that the Dr is not accepting NP she would like to know if you all have any recommendations as to who she can see for th pt please give return call

## 2023-08-25 NOTE — TELEPHONE ENCOUNTER
Ok to book as a new patient.     Will need to over ride in - book in DHOLD time in the next several weeks

## 2023-08-28 ENCOUNTER — PATIENT MESSAGE (OUTPATIENT)
Dept: NEUROLOGY | Facility: CLINIC | Age: 23
End: 2023-08-28
Payer: MEDICAID

## 2023-09-08 ENCOUNTER — OFFICE VISIT (OUTPATIENT)
Dept: NEUROLOGY | Facility: CLINIC | Age: 23
End: 2023-09-08
Payer: MEDICAID

## 2023-09-08 DIAGNOSIS — G40.909 SEIZURE DISORDER: Primary | ICD-10-CM

## 2023-09-08 DIAGNOSIS — R62.50 DEVELOPMENTAL DELAY: ICD-10-CM

## 2023-09-08 DIAGNOSIS — F84.0 AUTISM: ICD-10-CM

## 2023-09-08 PROCEDURE — 99215 OFFICE O/P EST HI 40 MIN: CPT | Mod: 95,,,

## 2023-09-08 PROCEDURE — 99215 PR OFFICE/OUTPT VISIT, EST, LEVL V, 40-54 MIN: ICD-10-PCS | Mod: 95,,,

## 2023-09-08 NOTE — PATIENT INSTRUCTIONS
Ann came to Epilepsy Clinic because of her seizure disorder. Her seizures are currently partially controlled on lamotrigine 10mls twice daily.  Please continue the same medication at the same dose.     In addition to the lamotrigine, I would like to try adding a medication called zonisamide. Please take 5mls of zonisamide nightly for one week then increase to 10mls nightly (200mg). This medication will likely make her drowsy when she first starts taking it. Other potential side effects include decreased appetite/weight loss and can slightly increase your risk for kidney stones so please drink plenty of water/fluids while taking this medication. If she develops a generalized body rash or eye pain, please stop the medication and let me know.    Do not miss any doses of medication. If a dose of medication is missed, take it as soon as it is remembered even if that means doubling up on the dose. Get regular sleep. Go to sleep at the same time and wake up at the same time every day. People with epilepsy require more sleep than people without epilepsy.  Sleeping 10-12 hours a day can be normal for a person with epilepsy.      Per [place] law, no episodes of loss of consciousness for 6 months before driving.  Avoid dangerous situations.  For example, no baths/pools alone, no heights, no power tools.  Wear a bike helmet.  If breakthrough seizures occur that are different in character, frequency, or duration from normal episodes, please patient portal me or call the office and we will decide the next steps. If multiple seizures occur in a row without return back to baseline, 911 needs to be called.     Return to clinic in December or sooner with issues.  Please patient portal with any questions or concerns.    Shellie De Souza PA-C   Neurology-Epilepsy  [facility]

## 2023-09-08 NOTE — PROGRESS NOTES
[facility]  Epilepsy Clinic Progress Note    [facility] - NEUROLOGY 7TH FL [facility] [place]    Date: 9/8/23  Patient Name: Ann Peterson   MRN: [id number]   PCP: Amalia Montague  Referring Provider: No ref. provider found    The patient location is: Home  The chief complaint leading to consultation is: Epilepsy  Visit type: Virtual visit with synchronous audio and video  Total time spent with patient: 23 minutes  Each patient to whom he or she provides medical services by telemedicine is:  (1) informed of the relationship between the physician and patient and the respective role of any other health care provider with respect to management of the patient; and (2) notified that he or she may decline to receive medical services by telemedicine and may withdraw from such care at any time.    Assessment:   Ann Peterson is a 23 y.o. female w/ autism presenting in follow up for epilepsy. No seizures for 2 years on lamotrigine 250mg BID (10mls BID of 25mg/ml oral solution) until she experienced a breakthrough 6/2023 and again 8/2023, no identifiable trigger. Adjunct w/ zonisamide 200mg nightly (potential sleep and weight loss benefit). Levels next visit. Follow up in 3 months or sooner with issues. Family is comfortable with plan. All questions and concerns are addressed at this time. Advised to reach out over the portal with any concerns or breakthrough events.   Plan:     Problem List Items Addressed This Visit          Neuro    Autism (Chronic)    Seizure disorder - Primary    Relevant Medications    zonisamide 100 mg/5 mL Susp    Developmental delay     Other Visit Diagnoses       BMI 40.0-44.9, adult              I completed education on seizure first aid and safety. I recommended seizure precautions with regards to avoiding unsupervised water recreational activity or bathing in tubs, climbing or working at heights, operation of heavy or dangerous machinery,  caution around fire and sources of high heat, as well as any other activity which could put a patient at danger in case of a seizure.  The patient does not drive.    As the patient is a female of childbearing age, I have counseled the patient on issues pertaining to pregnancy and epilepsy and recommended folic acid supplementation. I have reviewed and recommended the AAN guideline of folic acid supplementation prior to and during pregnancy.      Shellie De Souza PA-C   Neurology-Epilepsy  [facility]    Collaborating physician, Dr. Jesus Barney, was available during today's encounter.     I spent approximately 41 minutes on the day of this encounter preparing to see the patient, obtaining and reviewing history and results, performing a medically appropriate exam, counseling and educating the patient/family/caregiver, documenting clinical information, coordinating care, and ordering medications, tests, procedures, and referrals.    Patient note was created using MModal Dictation.  Any errors in syntax or even information may not have been identified and edited on initial review prior to signing this note.  Subjective:   Patient seen in consultation at the request of No ref. provider found for the evaluation of seizure. A copy of this note will be sent to the referring physician.     CC: Epilepsy    Interval Events/ROS 9/8/2023:    Current ASM/SEs: lamotrigine 10mls twice a day (250mg BID); no reported side effects  Breakthrough seizures/events: last sz was 3/2021 until breakthrough 6/11/2023, and again 8/8 & 8/28; events described as brief staring, body stiffens/freezes in position, does not respond, always goes to sleep afterward  Driving: does not drive   Folic acid: no  Sleep: fair, off and on  Mood: good    Unintentional weight gain, 254lbs. Otherwise, no fever, no cold symptoms, no headache, no new weakness, no chest pain, no shortness of breath, no vomiting, no diarrhea, no constipation, no  problems walking. ROS may be limited.    Recent Labs   Lab 08/03/21  1000 09/12/22  1348   Lamotrigine Lvl 5.3 7.4      Interval Events/ROS 9/12/2022:    Accompanied by parents who provide all of the history.     Current ASM/SEs: lamotrigine 10mls twice a day (250mg BID); no reported side effects   Breakthrough seizures/events: none; last seizure was 03/2021  Driving: no  Folic acid: no, recommended supplementation  Sleep: sleeps well for the most part; takes melatonin 10mg before bed    Mood: good, follows with psychiatry    Otherwise, no fever, no cold symptoms, no headache, no new weakness, no chest pain, no shortness of breath, no vomiting, no diarrhea, no constipation, no problems walking.       HPI:   Ms. Ann Peterson is a 23 y.o. female who presents with a chief complaint of longstanding epilepsy.  Patient presents to transition care from Dr. lofton today.  Her mother provides the history.  She reports that Ann has been doing well since her last visit with no breakthrough seizures since January.  She is tolerating Lamictal with no side effects.  They have no complaints today.  She attends [employer] and the patient's father axis her PCA.  Patient's mother states the patient sees a behavioral psychiatrist regularly and psychiatry has requested a Genetics referral.    Seizure Type: Unknown  Seizure Etiology:  Unknown  Current AEDs:  mg BID (tapering up to 250 mg BID)    The patient is accompanied by family who contribute to the history. This patient has 1 types of seizure as described below. The patient reports having seizures for years The patient reports to have stable seizure control. The seizure frequency is 2-3 per year. The last seizure was Jan 2020 . The patient does not report side effects from seizure medication.     Seizure Type 1:   Seizure Description: head turn (direction unclear), drooling, unresponsiveness followed by GTC, postictal confusion  Aura: None  Associated  Symptoms:  tongue biting  Seizure Frequency: 2-3 years  Last seizure: Jan 2021    Handedness: right  Seizure Onset Age: 14  Seizure/ Epilepsy Risk Factors: childhood seizure  Birth/Developmental History: normal birth history and delayed developmental history  Seizure Triggers/ Provoking Features: stress and illness/medical problems  Previous Seizure Medications: lamotrigine (Lamictal, LTG)  Recent Med Changes: None  Other Treatments: *None  Prior Episodes of Status: None  Psychiatric/Behavioral Comorbitidies: Developmental delay (limited speech, needs help for most ADLs)  Surgical Candidacy: n/a    Prior Studies:  EEG : Not done  vEEG/ EMU evaluation: Not done  MRI of brain: 6/2018 WNL, , personally reviewed  AED levels:  LTG 7.1 8/3/21  CT/CTA Scan: 10/2014- WNL, (sinusitis)  PET Scan:  Not done  Neuropsychological Evaluation: Not done  DEXA Scan: Not done  Other studies:  Not done    PAST MEDICAL HISTORY:  Past Medical History:   Diagnosis Date    Autism     Seizure        PAST SURGICAL HISTORY:  No past surgical history on file.    CURRENT MEDS:  Current Outpatient Medications   Medication Sig Dispense Refill    chlorproMAZINE (THORAZINE) 100 MG tablet Take 100 mg by mouth 2 (two) times daily.      custom compound oral suspension Take by mouth 2 (two) times a day. Lamictal 25 mg/ml, 9 mg BID x 1 wk then 10 mg BID 1 Bottle 0    diazePAM (VALIUM) 5 MG tablet Take 1 tablet by mouth as needed.      dicyclomine (BENTYL) 10 MG capsule Take 1 capsule (10 mg total) by mouth 3 (three) times daily as needed. (Patient not taking: No sig reported) 60 capsule 2    lamoTRIgine (LAMICTAL) 200 MG tablet Take 1 tablet (200 mg total) by mouth 2 (two) times daily. (SHOULD IN TOTAL BE TAKING 250mg TWICE DAILY) 60 tablet 11    lamoTRIgine (LAMICTAL) 200 MG tablet FOR BILLING ONLY-SEE LDME. crush & mix TO yield 25 mg/ml. DOSE AT TEN ML BY MOUTH TWICE DAILY 75 tablet 3    LAMOTRIGINE ORAL Take 10 mLs by mouth 2 (two) times a day.       linaCLOtide (LINZESS) 290 mcg Cap capsule Take 1 capsule (290 mcg total) by mouth once daily. 30 capsule 11    norethindrone (MICRONOR) 0.35 mg tablet Take 1 tablet (0.35 mg total) by mouth once daily. 84 tablet 3    risperiDONE (RISPERDAL) 1 MG tablet Take 3 mg by mouth 2 (two) times daily.       No current facility-administered medications for this visit.     Facility-Administered Medications Ordered in Other Visits   Medication Dose Route Frequency Provider Last Rate Last Admin    ondansetron HCl (PF) 4 mg/2 mL injection    PRN Alessio Santos III, MD   4 mg at 07/06/15 1109       ALLERGIES:  Review of patient's allergies indicates:  No Known Allergies    FAMILY HISTORY:  Family History   Problem Relation Age of Onset    Hypertension Father     Autism spectrum disorder Sister     Hypertension Paternal Grandmother     Heart disease Paternal Grandmother     Hyperlipidemia Paternal Grandmother     Breast cancer Neg Hx     Cancer Neg Hx     Colon cancer Neg Hx     Diabetes Neg Hx     Eclampsia Neg Hx     Miscarriages / Stillbirths Neg Hx     Ovarian cancer Neg Hx      labor Neg Hx     Stroke Neg Hx        SOCIAL HISTORY:  Social History     Tobacco Use    Smoking status: Never    Smokeless tobacco: Never   Substance Use Topics    Alcohol use: No    Drug use: No       Review of Systems:  12 system review of systems is negative except for the symptoms mentioned in HPI.      Objective:     There were no vitals filed for this visit.    General: NAD, well nourished   Eyes: no tearing, discharge, no erythema   ENT: moist mucous membranes of the oral cavity, nares patent    Neck: free ROM  Cardiovascular: appears well perfused  Lungs: Normal work of breathing, normal chest wall excursions  Skin: No rash, lesions, or breakdown on exposed skin  Psychiatry: Mood and affect are appropriate   Abdomen: not examined  Extremeties: not examined    Neurological   MENTAL STATUS: Nonverbal during our visit though makes  noises  CRANIAL NERVES: EOMI. Face symmetrical. Hearing grossly intact. Full shoulder shrug bilaterally. Tongue protrudes midline   SENSORY: not assessed   MOTOR: free range of motion of all extremities  CEREBELLAR/COORDINATION/GAIT: ambulates around the room without difficulty

## 2023-09-11 DIAGNOSIS — G40.909 EPILEPSY, UNSPECIFIED, NOT INTRACTABLE, WITHOUT STATUS EPILEPTICUS: ICD-10-CM

## 2023-09-11 RX ORDER — LAMOTRIGINE 200 MG/1
TABLET ORAL
Qty: 75 TABLET | Refills: 3 | Status: SHIPPED | OUTPATIENT
Start: 2023-09-11 | End: 2023-09-19 | Stop reason: SDUPTHER

## 2023-09-12 ENCOUNTER — PATIENT MESSAGE (OUTPATIENT)
Dept: NEUROLOGY | Facility: CLINIC | Age: 23
End: 2023-09-12
Payer: MEDICAID

## 2023-09-19 ENCOUNTER — OFFICE VISIT (OUTPATIENT)
Dept: INTERNAL MEDICINE | Facility: CLINIC | Age: 23
End: 2023-09-19
Payer: MEDICAID

## 2023-09-19 VITALS — WEIGHT: 251.13 LBS | HEIGHT: 62 IN | BODY MASS INDEX: 46.21 KG/M2

## 2023-09-19 DIAGNOSIS — G40.909 SEIZURE DISORDER: ICD-10-CM

## 2023-09-19 DIAGNOSIS — Z00.00 ROUTINE GENERAL MEDICAL EXAMINATION AT A HEALTH CARE FACILITY: Primary | ICD-10-CM

## 2023-09-19 DIAGNOSIS — R79.9 ABNORMAL BLOOD CHEMISTRY: ICD-10-CM

## 2023-09-19 DIAGNOSIS — E53.8 VITAMIN B12 DEFICIENCY: ICD-10-CM

## 2023-09-19 DIAGNOSIS — E55.9 VITAMIN D DEFICIENCY DISEASE: ICD-10-CM

## 2023-09-19 PROCEDURE — 1159F MED LIST DOCD IN RCRD: CPT | Mod: CPTII,,, | Performed by: INTERNAL MEDICINE

## 2023-09-19 PROCEDURE — 99385 PR PREVENTIVE VISIT,NEW,18-39: ICD-10-PCS | Mod: S$PBB,,, | Performed by: INTERNAL MEDICINE

## 2023-09-19 PROCEDURE — 99385 PREV VISIT NEW AGE 18-39: CPT | Mod: S$PBB,,, | Performed by: INTERNAL MEDICINE

## 2023-09-19 PROCEDURE — 99213 OFFICE O/P EST LOW 20 MIN: CPT | Mod: PBBFAC | Performed by: INTERNAL MEDICINE

## 2023-09-19 PROCEDURE — 3008F BODY MASS INDEX DOCD: CPT | Mod: CPTII,,, | Performed by: INTERNAL MEDICINE

## 2023-09-19 PROCEDURE — 1159F PR MEDICATION LIST DOCUMENTED IN MEDICAL RECORD: ICD-10-PCS | Mod: CPTII,,, | Performed by: INTERNAL MEDICINE

## 2023-09-19 PROCEDURE — 3008F PR BODY MASS INDEX (BMI) DOCUMENTED: ICD-10-PCS | Mod: CPTII,,, | Performed by: INTERNAL MEDICINE

## 2023-09-19 PROCEDURE — 99999 PR PBB SHADOW E&M-EST. PATIENT-LVL III: CPT | Mod: PBBFAC,,, | Performed by: INTERNAL MEDICINE

## 2023-09-19 PROCEDURE — 99999 PR PBB SHADOW E&M-EST. PATIENT-LVL III: ICD-10-PCS | Mod: PBBFAC,,, | Performed by: INTERNAL MEDICINE

## 2023-09-19 RX ORDER — RISPERIDONE 3 MG/1
3 TABLET ORAL NIGHTLY
COMMUNITY

## 2023-09-19 RX ORDER — RISPERIDONE 2 MG/1
2 TABLET ORAL EVERY MORNING
COMMUNITY

## 2023-09-19 NOTE — PROGRESS NOTES
Chief Complaint: Establish care    HPI: This is 23 year old woman who presents with her parents to establish care. This is her first time being seen in internal medicine. SHe is transferring from pediatrics. She has very limited verbal skills.  All history is obtained from her parents.     She is autistic and has a seizure disorder. She has aggressive and self injurious behavior.  She is followed by DR Amalia smith at [facility].  She is taking risperidone 2 mg in am and 3 mg in the evening for her behavior.  Risperidone was just decreased due to obesity. Behavior is not as great but she has lost some weight.  Her self injurous behavior consists of hitting her arms on the table. She will pick or slp herself.  She used to head bang when she was young. She no longer head bangs.     She has had seizures since 2014.  In October 2014 she had grand mal seizures.  She has had 3 seizures since June.  She was just prescribed Zonegran - has not started on it yet. She is currently taking Lamictal compounded suspension 10 ml twice daily.  She is followed by neurology at [facility].     She takes norethindrone 0.35 mg once daily for period regulation.  She has slight spotting at times.  She will get cramps - her father will give her ibuprofen 600 mg once or twice during  her menstrual cramps which helps.     Past Medical History:   Diagnosis Date    Autism     Intellectual disability     Seizure     Self-injurious behavior      History reviewed. No pertinent surgical history.  Social History     Socioeconomic History    Marital status: Single   Tobacco Use    Smoking status: Never    Smokeless tobacco: Never   Substance and Sexual Activity    Alcohol use: No    Drug use: No    Sexual activity: Never   Social History Narrative    Home with parents and sibs    2 cats and 2 dogs in the house.    She went to [employer] then went to [employer] until 2020 - got a certificate of completion.  Since COVID, she has not been wanting to do a  "day program.      Family Status   Relation Name Status    Mother  Alive    Father  Alive    Sister  Alive    Sister  Alive    Sister  Alive    PGM      PGF      Pat Uncle  Alive    Neg Hx  (Not Specified)             Meds and allergies: updated on Epic    REVIEW OF SYSTEMS: No apparetn fevers, chills, night sweats, fatigue, visual change, hearing loss, sinus congestion, sore throat, chest pain, shortness of breath, nausea, vomiting, constipation, diarrhea, dysuria, hematuria, polydipsia, polyuria, joint pain, muscle pain, headaches, anxiety, depression, insomnia.     Physical exam:  Ht 5' 2" (1.575 m)   Wt 113.9 kg (251 lb 1.7 oz)   BMI 45.93 kg/m²     General: alert, oriented x 3, no apparent distress.  Affect normal  HEENT: Conjunctivae: anicteric, PERRL, EOMI, TM clear, Oralpharynx clear  Neck: supple, no thyroid enlargement, no cervical lymphadenopathy  Resp: effort normal, lungs clear bilaterally  CV: Regular rate and rhythm without murmurs, gallops or rubs, no lower extremity edema,   Abdomen: soft, non-distended, non-tender, bowel sounds present, no hepatosplenomegaly.  Some excoriations on the back of her arms from picking      Assessment/Plan:  Annual exam - discussed diet, exercise and safety issues.   Autism with behavior disorder- to follow up with psychiatry    Seizure disorder- follow up with neurology    Morbid obesity - work on diet, exercise and weight management.  Labs    Routine labs    I will see her back in a year, sooner if problems arise.           "

## 2023-10-05 ENCOUNTER — LAB VISIT (OUTPATIENT)
Dept: LAB | Facility: HOSPITAL | Age: 23
End: 2023-10-05
Attending: INTERNAL MEDICINE
Payer: MEDICAID

## 2023-10-05 DIAGNOSIS — E55.9 VITAMIN D DEFICIENCY DISEASE: ICD-10-CM

## 2023-10-05 DIAGNOSIS — R79.9 ABNORMAL BLOOD CHEMISTRY: ICD-10-CM

## 2023-10-05 DIAGNOSIS — G40.909 SEIZURE DISORDER: ICD-10-CM

## 2023-10-05 DIAGNOSIS — E53.8 VITAMIN B12 DEFICIENCY: ICD-10-CM

## 2023-10-05 LAB
25(OH)D3+25(OH)D2 SERPL-MCNC: 30 NG/ML (ref 30–96)
ALBUMIN SERPL BCP-MCNC: 4.3 G/DL (ref 3.5–5.2)
ALP SERPL-CCNC: 132 U/L (ref 55–135)
ALT SERPL W/O P-5'-P-CCNC: 17 U/L (ref 10–44)
ANION GAP SERPL CALC-SCNC: 9 MMOL/L (ref 8–16)
AST SERPL-CCNC: 14 U/L (ref 10–40)
BASOPHILS # BLD AUTO: 0.04 K/UL (ref 0–0.2)
BASOPHILS NFR BLD: 0.4 % (ref 0–1.9)
BILIRUB SERPL-MCNC: 0.3 MG/DL (ref 0.1–1)
BUN SERPL-MCNC: 10 MG/DL (ref 6–20)
CALCIUM SERPL-MCNC: 9.5 MG/DL (ref 8.7–10.5)
CHLORIDE SERPL-SCNC: 107 MMOL/L (ref 95–110)
CHOLEST SERPL-MCNC: 160 MG/DL (ref 120–199)
CHOLEST/HDLC SERPL: 4.8 {RATIO} (ref 2–5)
CO2 SERPL-SCNC: 22 MMOL/L (ref 23–29)
CREAT SERPL-MCNC: 1 MG/DL (ref 0.5–1.4)
DIFFERENTIAL METHOD: ABNORMAL
EOSINOPHIL # BLD AUTO: 0.2 K/UL (ref 0–0.5)
EOSINOPHIL NFR BLD: 1.8 % (ref 0–8)
ERYTHROCYTE [DISTWIDTH] IN BLOOD BY AUTOMATED COUNT: 13.6 % (ref 11.5–14.5)
EST. GFR  (NO RACE VARIABLE): >60 ML/MIN/1.73 M^2
ESTIMATED AVG GLUCOSE: 105 MG/DL (ref 68–131)
GLUCOSE SERPL-MCNC: 119 MG/DL (ref 70–110)
HBA1C MFR BLD: 5.3 % (ref 4–5.6)
HCT VFR BLD AUTO: 41.7 % (ref 37–48.5)
HDLC SERPL-MCNC: 33 MG/DL (ref 40–75)
HDLC SERPL: 20.6 % (ref 20–50)
HGB BLD-MCNC: 13.1 G/DL (ref 12–16)
IMM GRANULOCYTES # BLD AUTO: 0.03 K/UL (ref 0–0.04)
IMM GRANULOCYTES NFR BLD AUTO: 0.3 % (ref 0–0.5)
LDLC SERPL CALC-MCNC: 111 MG/DL (ref 63–159)
LYMPHOCYTES # BLD AUTO: 2.8 K/UL (ref 1–4.8)
LYMPHOCYTES NFR BLD: 27.2 % (ref 18–48)
MCH RBC QN AUTO: 29 PG (ref 27–31)
MCHC RBC AUTO-ENTMCNC: 31.4 G/DL (ref 32–36)
MCV RBC AUTO: 93 FL (ref 82–98)
MONOCYTES # BLD AUTO: 0.9 K/UL (ref 0.3–1)
MONOCYTES NFR BLD: 8.6 % (ref 4–15)
NEUTROPHILS # BLD AUTO: 6.3 K/UL (ref 1.8–7.7)
NEUTROPHILS NFR BLD: 61.7 % (ref 38–73)
NONHDLC SERPL-MCNC: 127 MG/DL
NRBC BLD-RTO: 0 /100 WBC
PLATELET # BLD AUTO: 337 K/UL (ref 150–450)
PMV BLD AUTO: 10.6 FL (ref 9.2–12.9)
POTASSIUM SERPL-SCNC: 4.1 MMOL/L (ref 3.5–5.1)
PROT SERPL-MCNC: 7.6 G/DL (ref 6–8.4)
RBC # BLD AUTO: 4.51 M/UL (ref 4–5.4)
SODIUM SERPL-SCNC: 138 MMOL/L (ref 136–145)
TRIGL SERPL-MCNC: 80 MG/DL (ref 30–150)
TSH SERPL DL<=0.005 MIU/L-ACNC: 2.38 UIU/ML (ref 0.4–4)
VIT B12 SERPL-MCNC: 510 PG/ML (ref 210–950)
WBC # BLD AUTO: 10.19 K/UL (ref 3.9–12.7)

## 2023-10-05 PROCEDURE — 85025 COMPLETE CBC W/AUTO DIFF WBC: CPT | Performed by: INTERNAL MEDICINE

## 2023-10-05 PROCEDURE — 80175 DRUG SCREEN QUAN LAMOTRIGINE: CPT | Performed by: INTERNAL MEDICINE

## 2023-10-05 PROCEDURE — 83036 HEMOGLOBIN GLYCOSYLATED A1C: CPT | Performed by: INTERNAL MEDICINE

## 2023-10-05 PROCEDURE — 36415 COLL VENOUS BLD VENIPUNCTURE: CPT | Mod: PO | Performed by: INTERNAL MEDICINE

## 2023-10-05 PROCEDURE — 80061 LIPID PANEL: CPT | Performed by: INTERNAL MEDICINE

## 2023-10-05 PROCEDURE — 80053 COMPREHEN METABOLIC PANEL: CPT | Performed by: INTERNAL MEDICINE

## 2023-10-05 PROCEDURE — 84443 ASSAY THYROID STIM HORMONE: CPT | Performed by: INTERNAL MEDICINE

## 2023-10-05 PROCEDURE — 82607 VITAMIN B-12: CPT | Performed by: INTERNAL MEDICINE

## 2023-10-05 PROCEDURE — 82306 VITAMIN D 25 HYDROXY: CPT | Performed by: INTERNAL MEDICINE

## 2023-10-09 LAB — LAMOTRIGINE SERPL-MCNC: 9 UG/ML (ref 2–15)

## 2023-10-12 ENCOUNTER — PATIENT MESSAGE (OUTPATIENT)
Dept: NEUROLOGY | Facility: CLINIC | Age: 23
End: 2023-10-12
Payer: MEDICAID

## 2023-10-25 NOTE — PROGRESS NOTES
Discontinued zonisamide due to behavioral side effects. Increase lamotrigine from 250mg BID to 300mg BID. Called in prescription to [facility] [phone].      [facility] [place] [address].    Prescription for 90 day supply of lamotrigine 25mg/1ml oral solution -> take 12mls by mouth twice daily.     Shellie De Souza PA-C   Neurology-Epilepsy  [facility]

## 2023-11-15 ENCOUNTER — PATIENT MESSAGE (OUTPATIENT)
Dept: NEUROLOGY | Facility: CLINIC | Age: 23
End: 2023-11-15
Payer: MEDICAID

## 2023-11-17 ENCOUNTER — TELEPHONE (OUTPATIENT)
Dept: NEUROLOGY | Facility: CLINIC | Age: 23
End: 2023-11-17
Payer: MEDICAID

## 2023-11-17 RX ORDER — LAMOTRIGINE 100 MG/1
300 TABLET ORAL 2 TIMES DAILY
COMMUNITY
End: 2023-11-18 | Stop reason: SDUPTHER

## 2023-11-17 NOTE — TELEPHONE ENCOUNTER
----- Message from Tita Levy sent at 11/17/2023 10:47 AM CST -----  Regarding: pt adivce  Contact: [phone]  Pt mother Ruthy calling inrregards to pt medication custom compound oral suspension , medicaid no longer no covered the oral suspension, needing a new script sent over to pharmacy listed below ASAP. Pls call                  ..  [facility] [place]  [address] [place] [address].  [place] [address]  Phone: [phone] Fax: [phone]

## 2023-11-18 ENCOUNTER — NURSE TRIAGE (OUTPATIENT)
Dept: ADMINISTRATIVE | Facility: CLINIC | Age: 23
End: 2023-11-18
Payer: MEDICAID

## 2023-11-18 RX ORDER — LAMOTRIGINE 100 MG/1
300 TABLET ORAL 2 TIMES DAILY
Qty: 180 TABLET | Refills: 2 | Status: SHIPPED | OUTPATIENT
Start: 2023-11-18 | End: 2023-11-18 | Stop reason: SDUPTHER

## 2023-11-18 RX ORDER — LAMOTRIGINE 100 MG/1
300 TABLET ORAL 2 TIMES DAILY
Qty: 180 TABLET | Refills: 2 | Status: SHIPPED | OUTPATIENT
Start: 2023-11-18 | End: 2023-12-14 | Stop reason: SDUPTHER

## 2023-11-18 NOTE — PROGRESS NOTES
I was contacted by the on-call nurse, who communicated this patient has difficulty with getting access to her medications. Reportedly, she was on a special liquid formulation in the past that her insurance no longer covers.  Dr. Barney had kindly attempted to place an order for the oral tablets yesterday, but or an unknown technical reason, the order was not transmitted to the pharmacy. Given that I confirmed that the order was placed, knowing that this patient is on this medication & has been tolerating it, I placed an order more lamotrigine tablet form in the same dose, 300 mg BID, as the patient is nearly out of her medications. I attempted to reach out to the patient twice, but the call went to voicemail.  Given the urgency of the patient running out of her medication, & her pharmacy of choice closing at 13:00, orders were placed immediately.    Update:  I reached out to the patient's mother once more & confirmed the correct pharmacy, & informed her that the prescription has been sent.    11/18/2023  Alex Lim MD, [facility]  Neurology Resident, PGY-3  Department of Neurology  [facility]

## 2023-11-18 NOTE — TELEPHONE ENCOUNTER
Mom states recently notified by pharmacy that lamictal compound liquid no longer covered by medicaid. Discussed with Dr. Barney changing to pill form, Dr. Barney spoke with pharmacy yesterday but [facility] still does not have rx on file. Mom says pt take lamictal 300mg BID, will run out this weekend.     Per neuro oncall-will review chart & f/u once completed. Mom updated.     Reason for Disposition   [1] Prescription refill request for ESSENTIAL medicine (i.e., likelihood of harm to patient if not taken) AND [2] triager unable to refill per department policy    Protocols used: Medication Refill and Renewal Call-A-AH

## 2023-12-11 ENCOUNTER — PATIENT MESSAGE (OUTPATIENT)
Dept: NEUROLOGY | Facility: CLINIC | Age: 23
End: 2023-12-11
Payer: MEDICAID

## 2023-12-14 RX ORDER — LAMOTRIGINE 200 MG/1
300 TABLET ORAL 2 TIMES DAILY
Qty: 270 TABLET | Refills: 3 | Status: SHIPPED | OUTPATIENT
Start: 2023-12-14 | End: 2023-12-15 | Stop reason: SDUPTHER

## 2023-12-15 ENCOUNTER — OFFICE VISIT (OUTPATIENT)
Dept: NEUROLOGY | Facility: CLINIC | Age: 23
End: 2023-12-15
Payer: MEDICAID

## 2023-12-15 DIAGNOSIS — G40.909 SEIZURE DISORDER: Primary | ICD-10-CM

## 2023-12-15 PROCEDURE — 99213 PR OFFICE/OUTPT VISIT, EST, LEVL III, 20-29 MIN: ICD-10-PCS | Mod: 95,,, | Performed by: PSYCHIATRY & NEUROLOGY

## 2023-12-15 PROCEDURE — 3044F HG A1C LEVEL LT 7.0%: CPT | Mod: CPTII,95,, | Performed by: PSYCHIATRY & NEUROLOGY

## 2023-12-15 PROCEDURE — 3044F PR MOST RECENT HEMOGLOBIN A1C LEVEL <7.0%: ICD-10-PCS | Mod: CPTII,95,, | Performed by: PSYCHIATRY & NEUROLOGY

## 2023-12-15 PROCEDURE — 99213 OFFICE O/P EST LOW 20 MIN: CPT | Mod: 95,,, | Performed by: PSYCHIATRY & NEUROLOGY

## 2023-12-15 RX ORDER — LAMOTRIGINE 200 MG/1
300 TABLET ORAL 2 TIMES DAILY
Qty: 270 TABLET | Refills: 3 | Status: SHIPPED | OUTPATIENT
Start: 2023-12-15

## 2023-12-15 NOTE — PROGRESS NOTES
[facility]  Epilepsy Clinic Progress Note    [facility] - NEUROLOGY [facility]TH [facility] [place]    Date: 12/15/23  Patient Name: Ann Peterson   MRN: [id number]   PCP: Amalia Montague  Referring Provider: No ref. provider found      The patient location is: Home  The chief complaint leading to consultation is: seizure d/o  Visit type: Virtual visit with synchronous audio and video  Total time spent with patient: 10 min  Each patient to whom he or she provides medical services by telemedicine is:  (1) informed of the relationship between the physician and patient and the respective role of any other health care provider with respect to management of the patient; and (2) notified that he or she may decline to receive medical services by telemedicine and may withdraw from such care at any time.    Notes:   Assessment:   Ann Peterson is a 23 y.o. female presenting to establish care for epilepsy.  Continuing lamictal 300 mg BID.  Plan:     Problem List Items Addressed This Visit          Neuro    Seizure disorder - Primary    Relevant Medications    lamoTRIgine (LAMICTAL) 200 MG tablet     I completed education on seizure first aid and safety. I recommended seizure precautions with regards to avoiding unsupervised water recreational activity or bathing in tubs, climbing or working at heights, operation of heavy or dangerous machinery, caution around fire and sources of high heat, as well as any other activity which could put a patient at danger in case of a seizure.  The patient does not drive.    As the patient is a female of childbearing age, I have counseled the patient on issues pertaining to pregnancy and epilepsy and recommended folic acid supplementation. I have reviewed and recommended the AAN guideline of folic acid supplementation prior to and during pregnancy.      Jesus Barney MD  [facility]   Department of Neurology    Patient note was  created using MModal Dictation.  Any errors in syntax or even information may not have been identified and edited on initial review prior to signing this note.  Subjective:        HPI:   Ms. Ann Peterson is a 23 y.o. female who presents with a chief complaint of longstanding epilepsy.  Patient's parents present with her and provide the history today.  The patient's Lamictal dose was increased to 300 mg b.i.d. following a recent bout of breakthrough seizures however her mother reports that she has been seizure-free since her recent dose increase in August without any side effects.  They have no other complaints today. She has had no major  behavior issues recently.       Seizure Type: Unknown  Seizure Etiology:  Unknown  Current AEDs:  mg BID (tapering up to 250 mg BID)    The patient is accompanied by family who contribute to the history. This patient has 1 types of seizure as described below. The patient reports having seizures for years The patient reports to have stable seizure control. The seizure frequency is 2-3 per year. The last seizure was Jan 2020 . The patient does not report side effects from seizure medication.     Seizure Type 1:   Seizure Description: head turn (direction unclear), drooling, unresponsiveness followed by GTC, postictal confusion  Aura: None  Associated Symptoms:  tongue biting  Seizure Frequency: 2-3 years  Last seizure: Jan 2021    Handedness: right  Seizure Onset Age: 14  Seizure/ Epilepsy Risk Factors: childhood seizure  Birth/Developmental History: normal birth history and delayed developmental history  Seizure Triggers/ Provoking Features: stress and illness/medical problems  Previous Seizure Medications: lamotrigine (Lamictal, LTG)  Recent Med Changes: None  Other Treatments: *None  Prior Episodes of Status: None  Psychiatric/Behavioral Comorbitidies: Developmental delay (limited speech, needs help for most ADLs)  Surgical Candidacy: n/a    Prior Studies:  EEG : Not  done  vEEG/ EMU evaluation: Not done  MRI of brain: 6/2018 WNL, , personally reviewed  AED levels:  LTG 7.1 8/3/21  CT/CTA Scan: 10/2014- WNL, (sinusitis)  PET Scan:  Not done  Neuropsychological Evaluation: Not done  DEXA Scan: Not done  Other studies:  Not done    PAST MEDICAL HISTORY:  Past Medical History:   Diagnosis Date    Autism     Intellectual disability     Seizure     Self-injurious behavior        PAST SURGICAL HISTORY:  No past surgical history on file.    CURRENT MEDS:  Current Outpatient Medications   Medication Sig Dispense Refill    custom compound oral suspension Take by mouth 2 (two) times a day. Lamictal 25 mg/ml, 9 mg BID x 1 wk then 10 mg BID 1 Bottle 0    diazePAM (VALIUM) 5 MG tablet Take 1 tablet by mouth as needed.      lamoTRIgine (LAMICTAL) 200 MG tablet Take 1.5 tablets (300 mg total) by mouth 2 (two) times daily. 270 tablet 3    norethindrone (MICRONOR) 0.35 mg tablet Take 1 tablet (0.35 mg total) by mouth once daily. 84 tablet 3    risperiDONE (RISPERDAL) 2 MG tablet Take 2 mg by mouth every morning.      risperiDONE (RISPERDAL) 3 MG Tab Take 3 mg by mouth every evening.       No current facility-administered medications for this visit.     Facility-Administered Medications Ordered in Other Visits   Medication Dose Route Frequency Provider Last Rate Last Admin    ondansetron HCl (PF) 4 mg/2 mL injection    PRN Alessio Santos III, MD   4 mg at 07/06/15 0139       ALLERGIES:  Review of patient's allergies indicates:  No Known Allergies    FAMILY HISTORY:  Family History   Problem Relation Age of Onset    Diabetes Father     Hypertension Father     Heart failure Father     Autism spectrum disorder Sister     Autism Sister     ADD / ADHD Sister     Hypertension Paternal Grandmother     Heart disease Paternal Grandmother     Hyperlipidemia Paternal Grandmother     Heart failure Paternal Grandmother     Schizophrenia Paternal Uncle     Breast cancer Neg Hx     Cancer Neg Hx     Colon  cancer Neg Hx     Eclampsia Neg Hx     Miscarriages / Stillbirths Neg Hx     Ovarian cancer Neg Hx      labor Neg Hx     Stroke Neg Hx        SOCIAL HISTORY:  Social History     Tobacco Use    Smoking status: Never    Smokeless tobacco: Never   Substance Use Topics    Alcohol use: No    Drug use: No       Review of Systems:  12 system review of systems is negative except for the symptoms mentioned in HPI.      Objective:   There were no vitals filed for this visit.  General: NAD, well nourished   Eyes: no tearing, discharge, no erythema   ENT: moist mucous membranes of the oral cavity, nares patent    Neck: Supple, full range of motion  Cardiovascular: Warm and well perfused, pulses equal and symmetrical  Lungs: Normal work of breathing, normal chest wall excursions  Skin: No rash, lesions, or breakdown on exposed skin  Psychiatry: Mood and affect are appropriate   Abdomen: soft, non tender, non distended  Extremeties: No cyanosis, clubbing or edema.    Neurological   MENTAL STATUS: Nonverbal during out visit  CRANIAL NERVES: Visual fields intact. PERRL. EOMI. Facial sensation intact. Face symmetrical. Hearing grossly intact. Full shoulder shrug bilaterally. Tongue protrudes midline   SENSORY: Sensation grossly intact  MOTOR: Moves all extremities symmetrically.   Gross motor movements smooth

## 2023-12-18 ENCOUNTER — PATIENT MESSAGE (OUTPATIENT)
Dept: INTERNAL MEDICINE | Facility: CLINIC | Age: 23
End: 2023-12-18
Payer: MEDICAID

## 2023-12-18 RX ORDER — DOXYCYCLINE HYCLATE 100 MG
100 TABLET ORAL 2 TIMES DAILY
Qty: 14 TABLET | Refills: 0 | Status: SHIPPED | OUTPATIENT
Start: 2023-12-18 | End: 2023-12-25

## 2024-01-05 ENCOUNTER — PATIENT MESSAGE (OUTPATIENT)
Dept: NEUROLOGY | Facility: CLINIC | Age: 24
End: 2024-01-05
Payer: MEDICAID

## 2024-01-07 DIAGNOSIS — F84.0 AUTISM: ICD-10-CM

## 2024-01-07 DIAGNOSIS — N94.3 PMS (PREMENSTRUAL SYNDROME): ICD-10-CM

## 2024-01-07 DIAGNOSIS — N94.6 DYSMENORRHEA: ICD-10-CM

## 2024-01-08 NOTE — TELEPHONE ENCOUNTER
Refill Routing Note   Medication(s) are not appropriate for processing by [facility] for the following reason(s):      Patient not seen by provider within 15 months    [facility] action(s):  Defer Care Due:  None identified            Appointments  past 12m or future 3m with PCP    Date Provider   Last Visit   10/29/2021 Fernanda Chin MD   Next Visit   Visit date not found Fernanda Chin MD   ED visits in past 90 days: 0        Note composed:8:33 AM 01/08/2024

## 2024-01-09 RX ORDER — ACETAMINOPHEN AND CODEINE PHOSPHATE 120; 12 MG/5ML; MG/5ML
1 SOLUTION ORAL WEEKLY
Qty: 84 TABLET | Refills: 3 | Status: SHIPPED | OUTPATIENT
Start: 2024-01-09

## 2024-03-12 ENCOUNTER — PATIENT MESSAGE (OUTPATIENT)
Dept: PEDIATRICS | Facility: CLINIC | Age: 24
End: 2024-03-12
Payer: MEDICAID

## 2024-04-17 ENCOUNTER — PATIENT MESSAGE (OUTPATIENT)
Dept: NEUROLOGY | Facility: CLINIC | Age: 24
End: 2024-04-17
Payer: MEDICAID

## 2024-05-24 ENCOUNTER — OFFICE VISIT (OUTPATIENT)
Dept: NEUROLOGY | Facility: CLINIC | Age: 24
End: 2024-05-24
Payer: MEDICAID

## 2024-05-24 VITALS — BODY MASS INDEX: 44.12 KG/M2 | HEIGHT: 62 IN | WEIGHT: 239.75 LBS

## 2024-05-24 DIAGNOSIS — G40.909 SEIZURE DISORDER: Primary | ICD-10-CM

## 2024-05-24 PROCEDURE — 99214 OFFICE O/P EST MOD 30 MIN: CPT | Mod: S$PBB,,, | Performed by: PSYCHIATRY & NEUROLOGY

## 2024-05-24 PROCEDURE — 99999 PR PBB SHADOW E&M-EST. PATIENT-LVL II: CPT | Mod: PBBFAC,,, | Performed by: PSYCHIATRY & NEUROLOGY

## 2024-05-24 PROCEDURE — 3008F BODY MASS INDEX DOCD: CPT | Mod: CPTII,,, | Performed by: PSYCHIATRY & NEUROLOGY

## 2024-05-24 PROCEDURE — 99212 OFFICE O/P EST SF 10 MIN: CPT | Mod: PBBFAC | Performed by: PSYCHIATRY & NEUROLOGY

## 2024-05-24 RX ORDER — ESCITALOPRAM OXALATE 20 MG/1
20 TABLET ORAL
COMMUNITY
Start: 2024-04-24

## 2024-05-24 RX ORDER — MULTIVITAMIN
1 TABLET ORAL DAILY
COMMUNITY

## 2024-05-24 RX ORDER — MIDAZOLAM 5 MG/.1ML
5 SPRAY NASAL ONCE AS NEEDED
Qty: 2 EACH | Refills: 0 | Status: SHIPPED | OUTPATIENT
Start: 2024-05-24

## 2024-05-24 NOTE — PROGRESS NOTES
[facility]  Epilepsy [facility] Progress Note    [facility] - NEUROLOGY [facility]TH [place] [facility], [place]    Date: 5/24/24  Patient Name: Ann Peterson   MRN: [id number]   PCP: Amalia Montague  Referring Provider: No ref. provider found    Assessment:   Ann Peterson is a 23 y.o. female presenting to establish care for epilepsy.  Continuing lamictal 300 mg BID. Nayzilam provided for rescue.  Discussed options for behavior modification and control with family at length.  Plan:     Problem List Items Addressed This Visit          Neuro    Seizure disorder - Primary    Relevant Medications    midazolam (NAYZILAM) 5 mg/spray (0.1 mL) Spry       I completed education on seizure first aid and safety. I recommended seizure precautions with regards to avoiding unsupervised water recreational activity or bathing in tubs, climbing or working at heights, operation of heavy or dangerous machinery, caution around fire and sources of high heat, as well as any other activity which could put a patient at danger in case of a seizure.  The patient does not drive.    As the patient is a female of childbearing age, I have counseled the patient on issues pertaining to pregnancy and epilepsy and recommended folic acid supplementation. I have reviewed and recommended the AAN guideline of folic acid supplementation prior to and during pregnancy.      I spent a total of 31 minutes preparing to see the patient, obtaining and reviewing history and results, performing a medically appropriate exam, counseling and educating the patient/family/caregiver, documenting clinical information, coordinating care, and ordering medications, tests, procedures, and referrals.    Jesus Barney MD  [facility]   Department of Neurology    Patient note was created using MModal Dictation.  Any errors in syntax or even information may not have been identified and edited on initial review prior to signing  this note.  Subjective:        HPI:   Ms. Ann Peterson is a 23 y.o. female who presents with a chief complaint of longstanding epilepsy.  Patient presents today with her parents who provide the history.  They report that with regard to her seizure control she is doing well with no breakthrough seizures.  She has had an increase in picking behavior and has recently cut her eyelashes.  Patients parents expressed interest in rescue medication for seizure in the event of a prolonged seizure as patient has had seizures lasting up to 10 minutes in the past.     Seizure Type: Unknown  Seizure Etiology:  Unknown  Current AEDs:  mg BID (tapering up to 250 mg BID)    The patient is accompanied by family who contribute to the history. This patient has 1 types of seizure as described below. The patient reports having seizures for years The patient reports to have stable seizure control. The seizure frequency is 2-3 per year. The last seizure was Jan 2020 . The patient does not report side effects from seizure medication.     Seizure Type 1:   Seizure Description: head turn (direction unclear), drooling, unresponsiveness followed by GTC, postictal confusion  Aura: None  Associated Symptoms:  tongue biting  Seizure Frequency: 2-3 years  Last seizure: Jan 2021    Handedness: right  Seizure Onset Age: 14  Seizure/ Epilepsy Risk Factors: childhood seizure  Birth/Developmental History: normal birth history and delayed developmental history  Seizure Triggers/ Provoking Features: stress and illness/medical problems  Previous Seizure Medications: lamotrigine (Lamictal, LTG)  Recent Med Changes: None  Other Treatments: *None  Prior Episodes of Status: None  Psychiatric/Behavioral Comorbitidies: Developmental delay (limited speech, needs help for most ADLs)  Surgical Candidacy: n/a    Prior Studies:  EEG : Not done  vEEG/ EMU evaluation: Not done  MRI of brain: 6/2018 WNL, , personally reviewed  AED levels:  LTG 7.1  8/3/21  CT/CTA Scan: 10/2014- WNL, (sinusitis)  PET Scan:  Not done  Neuropsychological Evaluation: Not done  DEXA Scan: Not done  Other studies:  Not done    PAST MEDICAL HISTORY:  Past Medical History:   Diagnosis Date    Autism     Intellectual disability     Seizure     Self-injurious behavior        PAST SURGICAL HISTORY:  No past surgical history on file.    CURRENT MEDS:  Current Outpatient Medications   Medication Sig Dispense Refill    diazePAM (VALIUM) 5 MG tablet Take 1 tablet by mouth as needed.      EScitalopram oxalate (LEXAPRO) 20 MG tablet Take 20 mg by mouth.      Lactobacillus rhamnosus GG (CULTURELLE) 10 billion cell capsule Take 1 capsule by mouth once daily.      lamoTRIgine (LAMICTAL) 200 MG tablet Take 1.5 tablets (300 mg total) by mouth 2 (two) times daily. 270 tablet 3    multivitamin (THERAGRAN) per tablet Take 1 tablet by mouth once daily.      norethindrone (MICRONOR) 0.35 mg tablet TAKE 1 TABLET(0.35 MG) BY MOUTH EVERY DAY 84 tablet 3    risperiDONE (RISPERDAL) 2 MG tablet Take 2 mg by mouth every morning.      midazolam (NAYZILAM) 5 mg/spray (0.1 mL) Spry 5 mg by Nasal route 1 (one) time if needed. 2 each 0    risperiDONE (RISPERDAL) 3 MG Tab Take 3 mg by mouth every evening.       No current facility-administered medications for this visit.     Facility-Administered Medications Ordered in Other Visits   Medication Dose Route Frequency Provider Last Rate Last Admin    ondansetron HCl (PF) 4 mg/2 mL injection    PRN Alessio Santos III, MD   4 mg at 07/06/15 0621       ALLERGIES:  Review of patient's allergies indicates:  No Known Allergies    FAMILY HISTORY:  Family History   Problem Relation Name Age of Onset    Diabetes Father      Hypertension Father      Heart failure Father      Autism spectrum disorder Sister      Autism Sister      ADD / ADHD Sister      Hypertension Paternal Grandmother      Heart disease Paternal Grandmother      Hyperlipidemia Paternal Grandmother       "Heart failure Paternal Grandmother      Schizophrenia Paternal Uncle      Breast cancer Neg Hx      Cancer Neg Hx      Colon cancer Neg Hx      Eclampsia Neg Hx      Miscarriages / Stillbirths Neg Hx      Ovarian cancer Neg Hx       labor Neg Hx      Stroke Neg Hx         SOCIAL HISTORY:  Social History     Tobacco Use    Smoking status: Never    Smokeless tobacco: Never   Substance Use Topics    Alcohol use: No    Drug use: No       Review of Systems:  12 system review of systems is negative except for the symptoms mentioned in HPI.      Objective:     Vitals:    24 1021   Weight: 108.7 kg (239 lb 12 oz)   Height: 5' 2" (1.575 m)     General: NAD, well nourished   Eyes: no tearing, discharge, no erythema   ENT: moist mucous membranes of the oral cavity, nares patent    Neck: Supple, full range of motion  Cardiovascular: Warm and well perfused, pulses equal and symmetrical  Lungs: Normal work of breathing, normal chest wall excursions  Skin: No rash, lesions, or breakdown on exposed skin  Psychiatry: Mood and affect are appropriate   Abdomen: soft, non tender, non distended  Extremeties: No cyanosis, clubbing or edema.    Neurological   MENTAL STATUS: Nonverbal during out visit. Picks at skin.   CRANIAL NERVES: Visual fields intact. PERRL. EOMI. Facial sensation intact. Face symmetrical. Hearing grossly intact. Full shoulder shrug bilaterally. Tongue protrudes midline   SENSORY: Sensation grossly intact  MOTOR: Moves all extremities symmetrically.   Gross motor movements smooth      "

## 2024-06-10 ENCOUNTER — PATIENT MESSAGE (OUTPATIENT)
Dept: INTERNAL MEDICINE | Facility: CLINIC | Age: 24
End: 2024-06-10
Payer: MEDICAID

## 2024-06-19 ENCOUNTER — PATIENT MESSAGE (OUTPATIENT)
Dept: NEUROLOGY | Facility: CLINIC | Age: 24
End: 2024-06-19
Payer: MEDICAID

## 2024-09-10 NOTE — TELEPHONE ENCOUNTER
Maxi - not sure if this went through when I tried routing it before (tenex refill) - thanks - TS  
no weight-bearing restrictions/Left UE

## 2024-11-18 ENCOUNTER — PATIENT MESSAGE (OUTPATIENT)
Dept: NEUROLOGY | Facility: CLINIC | Age: 24
End: 2024-11-18
Payer: MEDICAID

## 2024-11-22 ENCOUNTER — OFFICE VISIT (OUTPATIENT)
Dept: INTERNAL MEDICINE | Facility: CLINIC | Age: 24
End: 2024-11-22
Payer: MEDICAID

## 2024-11-22 ENCOUNTER — LAB VISIT (OUTPATIENT)
Dept: LAB | Facility: HOSPITAL | Age: 24
End: 2024-11-22
Attending: INTERNAL MEDICINE
Payer: MEDICAID

## 2024-11-22 VITALS
BODY MASS INDEX: 45.19 KG/M2 | DIASTOLIC BLOOD PRESSURE: 80 MMHG | OXYGEN SATURATION: 97 % | HEIGHT: 62 IN | SYSTOLIC BLOOD PRESSURE: 110 MMHG | WEIGHT: 245.56 LBS | HEART RATE: 92 BPM

## 2024-11-22 DIAGNOSIS — R62.50 DEVELOPMENTAL DELAY: ICD-10-CM

## 2024-11-22 DIAGNOSIS — R46.89 AGGRESSIVE BEHAVIOR: ICD-10-CM

## 2024-11-22 DIAGNOSIS — E66.01 MORBID OBESITY: ICD-10-CM

## 2024-11-22 DIAGNOSIS — R79.9 ABNORMAL BLOOD CHEMISTRY: ICD-10-CM

## 2024-11-22 DIAGNOSIS — G40.909 SEIZURE DISORDER: ICD-10-CM

## 2024-11-22 DIAGNOSIS — L02.92 BOILS: ICD-10-CM

## 2024-11-22 DIAGNOSIS — F84.0 AUTISM: Primary | Chronic | ICD-10-CM

## 2024-11-22 LAB
ALBUMIN SERPL BCP-MCNC: 4.2 G/DL (ref 3.5–5.2)
ALP SERPL-CCNC: 108 U/L (ref 40–150)
ALT SERPL W/O P-5'-P-CCNC: 16 U/L (ref 10–44)
ANION GAP SERPL CALC-SCNC: 11 MMOL/L (ref 8–16)
AST SERPL-CCNC: 12 U/L (ref 10–40)
BASOPHILS # BLD AUTO: 0.03 K/UL (ref 0–0.2)
BASOPHILS NFR BLD: 0.3 % (ref 0–1.9)
BILIRUB SERPL-MCNC: 0.3 MG/DL (ref 0.1–1)
BUN SERPL-MCNC: 11 MG/DL (ref 6–20)
CALCIUM SERPL-MCNC: 9.6 MG/DL (ref 8.7–10.5)
CHLORIDE SERPL-SCNC: 108 MMOL/L (ref 95–110)
CO2 SERPL-SCNC: 21 MMOL/L (ref 23–29)
CREAT SERPL-MCNC: 0.9 MG/DL (ref 0.5–1.4)
DIFFERENTIAL METHOD BLD: NORMAL
EOSINOPHIL # BLD AUTO: 0.1 K/UL (ref 0–0.5)
EOSINOPHIL NFR BLD: 0.9 % (ref 0–8)
ERYTHROCYTE [DISTWIDTH] IN BLOOD BY AUTOMATED COUNT: 13.1 % (ref 11.5–14.5)
EST. GFR  (NO RACE VARIABLE): >60 ML/MIN/1.73 M^2
ESTIMATED AVG GLUCOSE: 105 MG/DL (ref 68–131)
GLUCOSE SERPL-MCNC: 129 MG/DL (ref 70–110)
HBA1C MFR BLD: 5.3 % (ref 4–5.6)
HCT VFR BLD AUTO: 41.3 % (ref 37–48.5)
HGB BLD-MCNC: 13.2 G/DL (ref 12–16)
IMM GRANULOCYTES # BLD AUTO: 0.02 K/UL (ref 0–0.04)
IMM GRANULOCYTES NFR BLD AUTO: 0.2 % (ref 0–0.5)
LYMPHOCYTES # BLD AUTO: 2.7 K/UL (ref 1–4.8)
LYMPHOCYTES NFR BLD: 30.6 % (ref 18–48)
MCH RBC QN AUTO: 29.9 PG (ref 27–31)
MCHC RBC AUTO-ENTMCNC: 32 G/DL (ref 32–36)
MCV RBC AUTO: 94 FL (ref 82–98)
MONOCYTES # BLD AUTO: 0.7 K/UL (ref 0.3–1)
MONOCYTES NFR BLD: 7.8 % (ref 4–15)
NEUTROPHILS # BLD AUTO: 5.3 K/UL (ref 1.8–7.7)
NEUTROPHILS NFR BLD: 60.2 % (ref 38–73)
NRBC BLD-RTO: 0 /100 WBC
PLATELET # BLD AUTO: 273 K/UL (ref 150–450)
PMV BLD AUTO: 10.5 FL (ref 9.2–12.9)
POTASSIUM SERPL-SCNC: 4.2 MMOL/L (ref 3.5–5.1)
PROT SERPL-MCNC: 7.4 G/DL (ref 6–8.4)
RBC # BLD AUTO: 4.41 M/UL (ref 4–5.4)
SODIUM SERPL-SCNC: 140 MMOL/L (ref 136–145)
TSH SERPL DL<=0.005 MIU/L-ACNC: 1.15 UIU/ML (ref 0.4–4)
WBC # BLD AUTO: 8.81 K/UL (ref 3.9–12.7)

## 2024-11-22 PROCEDURE — 85025 COMPLETE CBC W/AUTO DIFF WBC: CPT | Performed by: INTERNAL MEDICINE

## 2024-11-22 PROCEDURE — 83036 HEMOGLOBIN GLYCOSYLATED A1C: CPT | Performed by: INTERNAL MEDICINE

## 2024-11-22 PROCEDURE — 84443 ASSAY THYROID STIM HORMONE: CPT | Performed by: INTERNAL MEDICINE

## 2024-11-22 PROCEDURE — 80053 COMPREHEN METABOLIC PANEL: CPT | Performed by: INTERNAL MEDICINE

## 2024-11-22 PROCEDURE — 99999 PR PBB SHADOW E&M-EST. PATIENT-LVL IV: CPT | Mod: PBBFAC,,, | Performed by: INTERNAL MEDICINE

## 2024-11-22 PROCEDURE — 36415 COLL VENOUS BLD VENIPUNCTURE: CPT | Performed by: INTERNAL MEDICINE

## 2024-11-22 PROCEDURE — 99214 OFFICE O/P EST MOD 30 MIN: CPT | Mod: PBBFAC | Performed by: INTERNAL MEDICINE

## 2024-11-22 RX ORDER — CLONIDINE HYDROCHLORIDE 0.1 MG/1
0.1 TABLET ORAL 2 TIMES DAILY
COMMUNITY
Start: 2024-11-19

## 2024-11-22 NOTE — PROGRESS NOTES
Chief Complaint: Follow up of multiple issues     HPI: This is 24 year old woman who presents with her parents for above    She has very limited verbal skills.  All history is obtained from her parents.     She is now taking clonidine 0.1 mg in the evening.  They just saw her psychiatrist and will be able to increase to twice daily. She was able to redirect her self and verbalize what she wants.     She is autistic and has a seizure disorder. She has aggressive and self injurious behavior.  She is followed by Dr Amalia smith at [facility].  She is taking lexapro 20 mg daily and risperidone 2 mg in am and 3 mg in the evening for her behavior.   Her self injurous behavior consists of hitting her arms on the table. She will pick or slp herself.  She used to head bang when she was young. She no longer head bangs.      She has had seizures since 2014.  In October 2014 she had grand mal seizures.   She is currently taking Lamictal 200 mg 1.5 tablets (300 mg) twice daily.  She is followed by neurology at [facility] - last visit 5/24/24. She had a seizure in September and one earlier this week (very brief - she had jerking and making noise)      She takes norethindrone 0.35 mg once daily for period regulation.  She has slight spotting at times.  She will get cramps - her father will give her ibuprofen 600 mg once or twice during  her menstrual cramps which helps.     She has lost 6 pounds in the last year.           Past Medical History:   Diagnosis Date    Autism      Intellectual disability      Seizure      Self-injurious behavior        History reviewed. No pertinent surgical history.  Social History           Socioeconomic History    Marital status: Single   Tobacco Use    Smoking status: Never    Smokeless tobacco: Never   Substance and Sexual Activity    Alcohol use: No    Drug use: No    Sexual activity: Never   Social History Narrative     Home with parents and sibs     2 cats and 2 dogs in the house.     She went to [employer] then went to [employer] until 2020 - got a certificate of completion.  Since COVID, she has not been wanting to do a day program.         Father has CAD and heart failure - diagnosed in the last year        Meds and allergies: updated on Epic     REVIEW OF SYSTEMS: No apparetn fevers, chills, night sweats, fatigue, visual change, hearing loss, sinus congestion, sore throat, chest pain, shortness of breath, nausea, vomiting, constipation, diarrhea, dysuria, hematuria, polydipsia, polyuria, joint pain, muscle pain, headaches,     Physical exam:  /80   Pulse 92   Ht 5' 2" (1.575 m)   Wt 111.4 kg (245 lb 9.5 oz)   SpO2 97%   BMI 44.92 kg/m²        General: alert, oriented x 3, no apparent distress.  Affect normal  HEENT: Conjunctivae: anicteric, PERRL, EOMI, TM clear, Oralpharynx clear  Neck: supple, no thyroid enlargement, no cervical lymphadenopathy  Resp: effort normal, lungs clear bilaterally  CV: Regular rate and rhythm without murmurs, gallops or rubs, no lower extremity edema,   ABDOMEN: soft, non distended, non tender, bowel sounds present, no hepatosplenomgaly  BREAST: no abn seen, no nodules palpated, no axillary lad  Some excoriations on the back of her arms from picking   Some healing boils on legs and chest    Labs 10/5/2023.      Assessment/Plan:     Autism with behavior disorder and mental handicapped- to follow up with psychiatry     Seizure disorder- follow up with neurology     Morbid obesity - work on diet, exercise and weight management.  Labs    Boils - wash with hibiclense twice a week     Routine labs     "

## 2024-12-05 DIAGNOSIS — N94.6 DYSMENORRHEA: ICD-10-CM

## 2024-12-05 DIAGNOSIS — N94.3 PMS (PREMENSTRUAL SYNDROME): ICD-10-CM

## 2024-12-05 DIAGNOSIS — F84.0 AUTISM: ICD-10-CM

## 2024-12-05 RX ORDER — NORETHINDRONE 0.35 MG/1
1 TABLET ORAL WEEKLY
Qty: 84 TABLET | Refills: 3 | Status: SHIPPED | OUTPATIENT
Start: 2024-12-05

## 2024-12-05 NOTE — TELEPHONE ENCOUNTER
Refill Routing Note   Medication(s) are not appropriate for processing by [facility] for the following reason(s):        Patient not seen by provider within 15 months    [facility] action(s):  Defer               Appointments  past 12m or future 3m with PCP    Date Provider   Last Visit   10/29/2021 Fernanda Chin MD   Next Visit   Visit date not found Fernanda Chin MD   ED visits in past 90 days: 0        Note composed:10:12 AM 12/05/2024

## 2024-12-12 DIAGNOSIS — G40.909 SEIZURE DISORDER: ICD-10-CM

## 2024-12-12 RX ORDER — LAMOTRIGINE 200 MG/1
300 TABLET ORAL 2 TIMES DAILY
Qty: 270 TABLET | Refills: 3 | Status: SHIPPED | OUTPATIENT
Start: 2024-12-12

## 2025-07-16 ENCOUNTER — NURSE TRIAGE (OUTPATIENT)
Dept: ADMINISTRATIVE | Facility: CLINIC | Age: 25
End: 2025-07-16
Payer: MEDICAID

## 2025-07-16 NOTE — TELEPHONE ENCOUNTER
Father states pt may have conjunctivitis in her left eye. Her eye was really red yesterday. She had white discharge. This morning she had difficulty her left eye. It appears to be a little swollen and red. Her eyelashes were stuck together. She is experiencing some pain. Pt denies fever. Dispo is home care.  Reason for Disposition   Red eye caused by sunscreen, smoke, smog, chlorine, food, soap or other mild irritant    Additional Information   Negative: SEVERE eye pain (e.g., excruciating pain and patient unable to do normal activities)   Negative: Eyelid is very swollen (shut or almost) and fever   Negative: Recent eye surgery and has increasing eye pain   Negative: Patient sounds very sick or weak to the triager   Negative: MODERATE eye pain or discomfort (e.g., interferes with normal activities or awakens from sleep; more than mild)   Negative: Looking at light causes MODERATE to SEVERE eye pain (i.e., photophobia)   Negative: Blurred vision AND new-onset or getting worse     Pt nonverbal and unable to answer question   Negative: Cloudy spot or sore seen on the cornea (clear part of the eye)   Negative: Eyelid is very swollen (shut or almost)   Negative: Eyelid (outer) is very red   Negative: Vomiting   Negative: Foreign body sensation ('feels like something is in there')   Negative: Patient wants to be seen   Negative: Eye pain present > 24 hours   Negative: Bleeding on white of the eye and is taking Coumadin or known bleeding disorder (e.g., thrombocytopenia)   Negative: Only 1 eye is red, and persists > 48 hours   Negative: Red eyes present > 7 days   Negative: Bleeding on white of the eye    Protocols used: Eye - Redness-A-OH

## 2025-07-17 ENCOUNTER — OFFICE VISIT (OUTPATIENT)
Dept: INTERNAL MEDICINE | Facility: CLINIC | Age: 25
End: 2025-07-17
Payer: MEDICAID

## 2025-07-17 ENCOUNTER — PATIENT MESSAGE (OUTPATIENT)
Dept: INTERNAL MEDICINE | Facility: CLINIC | Age: 25
End: 2025-07-17

## 2025-07-17 VITALS
WEIGHT: 258.63 LBS | DIASTOLIC BLOOD PRESSURE: 64 MMHG | HEART RATE: 91 BPM | BODY MASS INDEX: 47.59 KG/M2 | OXYGEN SATURATION: 98 % | HEIGHT: 62 IN | SYSTOLIC BLOOD PRESSURE: 110 MMHG

## 2025-07-17 DIAGNOSIS — H57.89 EYE IRRITATION: ICD-10-CM

## 2025-07-17 DIAGNOSIS — F84.0 AUTISM: ICD-10-CM

## 2025-07-17 DIAGNOSIS — H10.32 ACUTE CONJUNCTIVITIS OF LEFT EYE, UNSPECIFIED ACUTE CONJUNCTIVITIS TYPE: Primary | ICD-10-CM

## 2025-07-17 DIAGNOSIS — R62.50 DEVELOPMENTAL DELAY: ICD-10-CM

## 2025-07-17 PROCEDURE — 3074F SYST BP LT 130 MM HG: CPT | Mod: CPTII,,, | Performed by: STUDENT IN AN ORGANIZED HEALTH CARE EDUCATION/TRAINING PROGRAM

## 2025-07-17 PROCEDURE — 99214 OFFICE O/P EST MOD 30 MIN: CPT | Mod: GE,S$PBB,, | Performed by: STUDENT IN AN ORGANIZED HEALTH CARE EDUCATION/TRAINING PROGRAM

## 2025-07-17 PROCEDURE — 1160F RVW MEDS BY RX/DR IN RCRD: CPT | Mod: CPTII,,, | Performed by: STUDENT IN AN ORGANIZED HEALTH CARE EDUCATION/TRAINING PROGRAM

## 2025-07-17 PROCEDURE — 3078F DIAST BP <80 MM HG: CPT | Mod: CPTII,,, | Performed by: STUDENT IN AN ORGANIZED HEALTH CARE EDUCATION/TRAINING PROGRAM

## 2025-07-17 PROCEDURE — 99214 OFFICE O/P EST MOD 30 MIN: CPT | Mod: PBBFAC

## 2025-07-17 PROCEDURE — 99999 PR PBB SHADOW E&M-EST. PATIENT-LVL IV: CPT | Mod: PBBFAC,,,

## 2025-07-17 PROCEDURE — 3008F BODY MASS INDEX DOCD: CPT | Mod: CPTII,,, | Performed by: STUDENT IN AN ORGANIZED HEALTH CARE EDUCATION/TRAINING PROGRAM

## 2025-07-17 PROCEDURE — 1159F MED LIST DOCD IN RCRD: CPT | Mod: CPTII,,, | Performed by: STUDENT IN AN ORGANIZED HEALTH CARE EDUCATION/TRAINING PROGRAM

## 2025-07-17 RX ORDER — MINERAL OIL AND PETROLATUM 150; 830 MG/G; MG/G
OINTMENT OPHTHALMIC EVERY 6 HOURS PRN
Qty: 3.5 G | Refills: 0 | Status: CANCELLED | OUTPATIENT
Start: 2025-07-17 | End: 2025-07-24

## 2025-07-17 RX ORDER — CIPROFLOXACIN HYDROCHLORIDE 3 MG/ML
1 SOLUTION/ DROPS OPHTHALMIC 4 TIMES DAILY
Qty: 10 ML | Refills: 0 | Status: SHIPPED | OUTPATIENT
Start: 2025-07-17 | End: 2025-07-22

## 2025-07-17 NOTE — PROGRESS NOTES
"Subjective     Chief Complaint: Urgent Care-    History of Present Illness:  Ms. Ann Peterson is a 25 y.o. female w/ acute onset eye irritation, unilateral, left side since Monday/Tuesday. Got worse Tuesday /Wednesday w/ swelling, redness, "junk", and reported to be encrusted shut. Notably patient autisitic and developmentally delayed;  history is 90% from parents     Possibly painful, patient denies but noticibly more eye irritation and patient ruibbing it and having fits. Crusting shut this AM treated with warm clot and family started using Visine today    Patient reported to have good hand hygiene.     Denies allergy history.     Deneis other complaints  ROS as per HPI  PAST HISTORY:     Past Medical History:   Diagnosis Date    Autism     Intellectual disability     Seizure     Self-injurious behavior        No past surgical history on file.    Family History   Problem Relation Name Age of Onset    Diabetes Father      Hypertension Father      Heart failure Father      Coronary artery disease Father      Autism spectrum disorder Sister      Autism Sister      ADD / ADHD Sister      Hypertension Paternal Grandmother      Heart disease Paternal Grandmother      Hyperlipidemia Paternal Grandmother      Heart failure Paternal Grandmother      Schizophrenia Paternal Uncle      Breast cancer Neg Hx      Cancer Neg Hx      Colon cancer Neg Hx      Eclampsia Neg Hx      Miscarriages / Stillbirths Neg Hx      Ovarian cancer Neg Hx       labor Neg Hx      Stroke Neg Hx         Social History     Socioeconomic History    Marital status: Single   Tobacco Use    Smoking status: Never    Smokeless tobacco: Never   Substance and Sexual Activity    Alcohol use: No    Drug use: No    Sexual activity: Never   Social History Narrative    Home with parents and sibs    2 cats and 2 dogs in the house.    She went to [employer] then went to [employer] until  - got a certificate of completion.  Since COVID, " she has not been wanting to do a day program.      Social Drivers of Health     Financial Resource Strain: Patient Declined (7/17/2025)    Overall Financial Resource Strain (CARDIA)     Difficulty of Paying Living Expenses: Patient declined   Food Insecurity: Patient Declined (7/17/2025)    Hunger Vital Sign     Worried About Running Out of Food in the Last Year: Patient declined     Ran Out of Food in the Last Year: Patient declined   Transportation Needs: Patient Declined (7/17/2025)    PRAPARE - Transportation     Lack of Transportation (Medical): Patient declined     Lack of Transportation (Non-Medical): Patient declined   Physical Activity: Patient Declined (7/17/2025)    Exercise Vital Sign     Days of Exercise per Week: Patient declined     Minutes of Exercise per Session: Patient declined   Stress: Patient Declined (7/17/2025)    [employer] - Occupational Stress Questionnaire     Feeling of Stress : Patient declined   Housing Stability: Patient Declined (7/17/2025)    Housing Stability Vital Sign     Unable to Pay for Housing in the Last Year: Patient declined     Homeless in the Last Year: Patient declined       MEDICATIONS & ALLERGIES:     Current Outpatient Medications on File Prior to Visit   Medication Sig    cloNIDine (CATAPRES) 0.1 MG tablet Take 0.1 mg by mouth 2 (two) times daily.    diazePAM (VALIUM) 5 MG tablet Take 1 tablet by mouth as needed.    EScitalopram oxalate (LEXAPRO) 20 MG tablet Take 20 mg by mouth.    Lactobacillus rhamnosus GG (CULTURELLE) 10 billion cell capsule Take 1 capsule by mouth once daily.    lamoTRIgine (LAMICTAL) 200 MG tablet TAKE 1 AND 1/2 TABLETS BY MOUTH TWICE DAILY    multivitamin (THERAGRAN) per tablet Take 1 tablet by mouth once daily.    norethindrone (MICRONOR) 0.35 mg tablet TAKE 1 TABLET(0.35 MG) BY MOUTH EVERY DAY    risperiDONE (RISPERDAL) 2 MG tablet Take 2 mg by mouth every morning.    risperiDONE (RISPERDAL) 3 MG Tab Take 3 mg  "by mouth every evening.    midazolam (NAYZILAM) 5 mg/spray (0.1 mL) Spry 5 mg by Nasal route 1 (one) time if needed. (Patient not taking: Reported on 7/17/2025)     Current Facility-Administered Medications on File Prior to Visit   Medication    ondansetron HCl (PF) 4 mg/2 mL injection       Review of patient's allergies indicates:  No Known Allergies    OBJECTIVE:     Vital Signs:  Vitals:    07/17/25 1459   BP: 110/64   BP Location: Right arm   Patient Position: Sitting   Pulse: 91   SpO2: 98%   Weight: 117.3 kg (258 lb 9.6 oz)   Height: 5' 2" (1.575 m)       Body mass index is 47.3 kg/m².     Physical Exam  Vitals and nursing note reviewed. Exam conducted with a chaperone present.   Constitutional:       General: She is not in acute distress.     Appearance: Normal appearance. She is obese. She is not ill-appearing or toxic-appearing.   HENT:      Head: Normocephalic and atraumatic.      Right Ear: External ear normal.      Left Ear: External ear normal.      Nose: Nose normal.   Eyes:      General: Lids are normal. Gaze aligned appropriately. No allergic shiner, visual field deficit or scleral icterus.        Left eye: No foreign body, discharge or hordeolum.      Extraocular Movements: Extraocular movements intact.      Right eye: Normal extraocular motion.      Left eye: Normal extraocular motion.      Conjunctiva/sclera:      Right eye: Right conjunctiva is not injected. No chemosis, exudate or hemorrhage.     Left eye: Left conjunctiva is injected. No chemosis, exudate or hemorrhage.    Pulmonary:      Effort: Pulmonary effort is normal. No respiratory distress.   Abdominal:      General: Abdomen is flat. There is no distension.   Musculoskeletal:         General: Normal range of motion.      Cervical back: Normal range of motion.   Skin:     General: Skin is dry.   Neurological:      Mental Status: She is alert and oriented to person, place, and time. Mental status is at baseline.   Psychiatric:         " Mood and Affect: Mood normal.         Behavior: Behavior normal.         Thought Content: Thought content normal.         Judgment: Judgment normal.         Laboratory  No results found for this or any previous visit (from the past 24 hours).    Diagnostic Results:      Health Maintenance Due   Topic Date Due    Hepatitis C Screening  Never done    HIV Screening  Never done    HPV Vaccines (1 - 3-dose series) Never done    Pap Smear  Never done    COVID-19 Vaccine (4 - 2024-25 season) 09/01/2024         ASSESSMENT & PLAN:     1. Eye irritation  -     Ambulatory referral/consult to Optometry; Future; Expected date: 07/24/2025    2. Developmental delay    3. Autism    Other orders  -     ciprofloxacin HCl (CILOXAN) 0.3 % ophthalmic solution; Place 1 drop into the left eye 4 (four) times daily. for 5 days  Dispense: 10 mL; Refill: 0        MsYuly Peterson is a 25 y.o. female who presents as an UC visit today w/ eye irritation.       Couple days since onset not getting better. Patient having difficulty effectively communicating with us d/t underlying autism.     Will prescribe eye drops and monitor closely.     Optometry ref.    Follow up prn    Discussed with Dr Flood -- staff attestation to follow      Yuriy Myers DO MPH  Internal Medicine PGY-3  [facility]

## 2025-07-24 ENCOUNTER — PATIENT MESSAGE (OUTPATIENT)
Dept: INTERNAL MEDICINE | Facility: CLINIC | Age: 25
End: 2025-07-24
Payer: MEDICAID

## 2025-07-25 ENCOUNTER — TELEPHONE (OUTPATIENT)
Dept: NEUROLOGY | Facility: HOSPITAL | Age: 25
End: 2025-07-25
Payer: MEDICAID

## 2025-07-25 ENCOUNTER — TELEPHONE (OUTPATIENT)
Dept: OPHTHALMOLOGY | Facility: CLINIC | Age: 25
End: 2025-07-25
Payer: MEDICAID

## 2025-07-25 DIAGNOSIS — H57.89 EYE IRRITATION: Primary | ICD-10-CM

## 2025-07-25 NOTE — TELEPHONE ENCOUNTER
Called patient but no answer, left a VM. Agree with advice given by JANETT Montenegro earlier today.

## 2025-07-25 NOTE — TELEPHONE ENCOUNTER
Copied from CRM #[id number]. Topic: Appointments - Appointment Scheduling  >> Jul 25, 2025  9:46 AM Prisca wrote:  Type: Medical Advice    Who Called:Lisbeth (mother)    Would the patient rather a call back or a response via [facility]? Call back     Best Call Back Number: [phone]    Additional Information:Patent mother called about patients left Eye irritation and bright red. Patient is autistic and could possible not do well with having eye examed.